# Patient Record
Sex: MALE | Race: BLACK OR AFRICAN AMERICAN | NOT HISPANIC OR LATINO | Employment: UNEMPLOYED | URBAN - NONMETROPOLITAN AREA
[De-identification: names, ages, dates, MRNs, and addresses within clinical notes are randomized per-mention and may not be internally consistent; named-entity substitution may affect disease eponyms.]

---

## 2017-10-18 ENCOUNTER — HOSPITAL ENCOUNTER (INPATIENT)
Facility: HOSPITAL | Age: 52
LOS: 7 days | Discharge: HOME | End: 2017-10-25
Attending: PSYCHIATRY & NEUROLOGY | Admitting: PSYCHIATRY & NEUROLOGY

## 2017-10-18 ENCOUNTER — HOSPITAL ENCOUNTER (EMERGENCY)
Facility: HOSPITAL | Age: 52
Discharge: PSYCHIATRIC OR SUBSTANCE USE FACILITY | End: 2017-10-18
Attending: EMERGENCY MEDICINE | Admitting: EMERGENCY MEDICINE

## 2017-10-18 VITALS
OXYGEN SATURATION: 98 % | HEIGHT: 71 IN | HEART RATE: 90 BPM | SYSTOLIC BLOOD PRESSURE: 131 MMHG | RESPIRATION RATE: 18 BRPM | BODY MASS INDEX: 25.9 KG/M2 | DIASTOLIC BLOOD PRESSURE: 86 MMHG | WEIGHT: 185 LBS | TEMPERATURE: 98.7 F

## 2017-10-18 DIAGNOSIS — R45.851 SUICIDAL IDEATIONS: Primary | ICD-10-CM

## 2017-10-18 PROBLEM — F31.81 BIPOLAR II DISORDER (HCC): Status: ACTIVE | Noted: 2017-10-18

## 2017-10-18 PROBLEM — F32.9 MAJOR DEPRESSION: Status: ACTIVE | Noted: 2017-10-18

## 2017-10-18 PROBLEM — F10.20 UNCOMPLICATED ALCOHOL DEPENDENCE (HCC): Status: ACTIVE | Noted: 2017-10-18

## 2017-10-18 LAB
ALBUMIN SERPL-MCNC: 4.5 G/DL (ref 3.5–5)
ALBUMIN/GLOB SERPL: 1.6 G/DL (ref 1–2)
ALP SERPL-CCNC: 69 U/L (ref 38–126)
ALT SERPL W P-5'-P-CCNC: 31 U/L (ref 13–69)
AMPHET+METHAMPHET UR QL: NEGATIVE
AMPHETAMINES UR QL: NEGATIVE
ANION GAP SERPL CALCULATED.3IONS-SCNC: 15.6 MMOL/L
APAP SERPL-MCNC: <10 MCG/ML
AST SERPL-CCNC: 23 U/L (ref 15–46)
BARBITURATES UR QL SCN: NEGATIVE
BASOPHILS # BLD AUTO: 0.07 10*3/MM3 (ref 0–0.2)
BASOPHILS NFR BLD AUTO: 0.7 % (ref 0–2.5)
BENZODIAZ UR QL SCN: NEGATIVE
BILIRUB SERPL-MCNC: 0.2 MG/DL (ref 0.2–1.3)
BUN BLD-MCNC: 15 MG/DL (ref 7–20)
BUN/CREAT SERPL: 13.6 (ref 6.3–21.9)
BUPRENORPHINE SERPL-MCNC: NEGATIVE NG/ML
CALCIUM SPEC-SCNC: 9.5 MG/DL (ref 8.4–10.2)
CANNABINOIDS SERPL QL: NEGATIVE
CHLORIDE SERPL-SCNC: 102 MMOL/L (ref 98–107)
CO2 SERPL-SCNC: 28 MMOL/L (ref 26–30)
COCAINE UR QL: NEGATIVE
CREAT BLD-MCNC: 1.1 MG/DL (ref 0.6–1.3)
DEPRECATED RDW RBC AUTO: 42.1 FL (ref 37–54)
EOSINOPHIL # BLD AUTO: 0.33 10*3/MM3 (ref 0–0.7)
EOSINOPHIL NFR BLD AUTO: 3.2 % (ref 0–7)
ERYTHROCYTE [DISTWIDTH] IN BLOOD BY AUTOMATED COUNT: 14.1 % (ref 11.5–14.5)
ETHANOL BLD-MCNC: <10 MG/DL
ETHANOL UR QL: <0.01 %
GFR SERPL CREATININE-BSD FRML MDRD: 71 ML/MIN/1.73
GFR SERPL CREATININE-BSD FRML MDRD: 86 ML/MIN/1.73
GLOBULIN UR ELPH-MCNC: 2.8 GM/DL
GLUCOSE BLD-MCNC: 86 MG/DL (ref 74–98)
HCT VFR BLD AUTO: 41.4 % (ref 42–52)
HGB BLD-MCNC: 13.4 G/DL (ref 14–18)
HOLD SPECIMEN: NORMAL
HOLD SPECIMEN: NORMAL
IMM GRANULOCYTES # BLD: 0.04 10*3/MM3 (ref 0–0.06)
IMM GRANULOCYTES NFR BLD: 0.4 % (ref 0–0.6)
LYMPHOCYTES # BLD AUTO: 3.46 10*3/MM3 (ref 0.6–3.4)
LYMPHOCYTES NFR BLD AUTO: 33.5 % (ref 10–50)
MAGNESIUM SERPL-MCNC: 1.9 MG/DL (ref 1.6–2.3)
MCH RBC QN AUTO: 26.9 PG (ref 27–31)
MCHC RBC AUTO-ENTMCNC: 32.4 G/DL (ref 30–37)
MCV RBC AUTO: 83 FL (ref 80–94)
METHADONE UR QL SCN: NEGATIVE
MONOCYTES # BLD AUTO: 0.78 10*3/MM3 (ref 0–0.9)
MONOCYTES NFR BLD AUTO: 7.6 % (ref 0–12)
NEUTROPHILS # BLD AUTO: 5.64 10*3/MM3 (ref 2–6.9)
NEUTROPHILS NFR BLD AUTO: 54.6 % (ref 37–80)
NRBC BLD MANUAL-RTO: 0 /100 WBC (ref 0–0)
OPIATES UR QL: NEGATIVE
OXYCODONE UR QL SCN: NEGATIVE
PCP UR QL SCN: NEGATIVE
PLATELET # BLD AUTO: 231 10*3/MM3 (ref 130–400)
PMV BLD AUTO: 11.1 FL (ref 6–12)
POTASSIUM BLD-SCNC: 3.6 MMOL/L (ref 3.5–5.1)
PROPOXYPH UR QL: NEGATIVE
PROT SERPL-MCNC: 7.3 G/DL (ref 6.3–8.2)
RBC # BLD AUTO: 4.99 10*6/MM3 (ref 4.7–6.1)
SALICYLATES SERPL-MCNC: <1 MG/DL (ref 2.8–20)
SODIUM BLD-SCNC: 142 MMOL/L (ref 137–145)
TRICYCLICS UR QL SCN: POSITIVE
WBC NRBC COR # BLD: 10.32 10*3/MM3 (ref 4.8–10.8)
WHOLE BLOOD HOLD SPECIMEN: NORMAL
WHOLE BLOOD HOLD SPECIMEN: NORMAL

## 2017-10-18 PROCEDURE — 80307 DRUG TEST PRSMV CHEM ANLYZR: CPT | Performed by: EMERGENCY MEDICINE

## 2017-10-18 PROCEDURE — HZ2ZZZZ DETOXIFICATION SERVICES FOR SUBSTANCE ABUSE TREATMENT: ICD-10-PCS | Performed by: PSYCHIATRY & NEUROLOGY

## 2017-10-18 PROCEDURE — 93005 ELECTROCARDIOGRAM TRACING: CPT | Performed by: EMERGENCY MEDICINE

## 2017-10-18 PROCEDURE — 80306 DRUG TEST PRSMV INSTRMNT: CPT | Performed by: EMERGENCY MEDICINE

## 2017-10-18 PROCEDURE — 85025 COMPLETE CBC W/AUTO DIFF WBC: CPT | Performed by: EMERGENCY MEDICINE

## 2017-10-18 PROCEDURE — 83735 ASSAY OF MAGNESIUM: CPT | Performed by: EMERGENCY MEDICINE

## 2017-10-18 PROCEDURE — 80053 COMPREHEN METABOLIC PANEL: CPT | Performed by: EMERGENCY MEDICINE

## 2017-10-18 PROCEDURE — 99223 1ST HOSP IP/OBS HIGH 75: CPT | Performed by: PSYCHIATRY & NEUROLOGY

## 2017-10-18 PROCEDURE — 99284 EMERGENCY DEPT VISIT MOD MDM: CPT

## 2017-10-18 RX ORDER — QUETIAPINE FUMARATE 300 MG/1
300 TABLET, FILM COATED ORAL NIGHTLY
Status: ON HOLD | COMMUNITY
End: 2017-10-18

## 2017-10-18 RX ORDER — ESCITALOPRAM OXALATE 20 MG/1
20 TABLET ORAL DAILY
Status: ON HOLD | COMMUNITY
End: 2017-10-18

## 2017-10-18 RX ORDER — NICOTINE 21 MG/24HR
1 PATCH, TRANSDERMAL 24 HOURS TRANSDERMAL EVERY 24 HOURS
Status: DISCONTINUED | OUTPATIENT
Start: 2017-10-18 | End: 2017-10-25 | Stop reason: HOSPADM

## 2017-10-18 RX ORDER — DIVALPROEX SODIUM 500 MG/1
1000 TABLET, EXTENDED RELEASE ORAL DAILY
Status: DISCONTINUED | OUTPATIENT
Start: 2017-10-18 | End: 2017-10-25 | Stop reason: HOSPADM

## 2017-10-18 RX ORDER — DIVALPROEX SODIUM 500 MG/1
1500 TABLET, EXTENDED RELEASE ORAL NIGHTLY
COMMUNITY
End: 2017-10-25 | Stop reason: HOSPADM

## 2017-10-18 RX ORDER — BENZTROPINE MESYLATE 1 MG/ML
0.5 INJECTION INTRAMUSCULAR; INTRAVENOUS DAILY PRN
Status: DISCONTINUED | OUTPATIENT
Start: 2017-10-18 | End: 2017-10-25 | Stop reason: HOSPADM

## 2017-10-18 RX ORDER — ACAMPROSATE CALCIUM 333 MG/1
333 TABLET, DELAYED RELEASE ORAL 2 TIMES DAILY
Status: ON HOLD | COMMUNITY
End: 2017-10-18

## 2017-10-18 RX ORDER — BENZTROPINE MESYLATE 1 MG/1
1 TABLET ORAL DAILY PRN
Status: DISCONTINUED | OUTPATIENT
Start: 2017-10-18 | End: 2017-10-25 | Stop reason: HOSPADM

## 2017-10-18 RX ORDER — ARIPIPRAZOLE 10 MG/1
10 TABLET ORAL DAILY
Status: DISCONTINUED | OUTPATIENT
Start: 2017-10-18 | End: 2017-10-23

## 2017-10-18 RX ORDER — ALUMINA, MAGNESIA, AND SIMETHICONE 2400; 2400; 240 MG/30ML; MG/30ML; MG/30ML
15 SUSPENSION ORAL EVERY 6 HOURS PRN
Status: DISCONTINUED | OUTPATIENT
Start: 2017-10-18 | End: 2017-10-25 | Stop reason: HOSPADM

## 2017-10-18 RX ORDER — LORAZEPAM 0.5 MG/1
0.5 TABLET ORAL 3 TIMES DAILY
Status: ON HOLD | COMMUNITY
End: 2017-10-18

## 2017-10-18 RX ORDER — QUETIAPINE FUMARATE 200 MG/1
400 TABLET, FILM COATED ORAL NIGHTLY
COMMUNITY
End: 2017-10-25 | Stop reason: HOSPADM

## 2017-10-18 RX ORDER — ACAMPROSATE CALCIUM 333 MG/1
666 TABLET, DELAYED RELEASE ORAL 3 TIMES DAILY
Status: DISCONTINUED | OUTPATIENT
Start: 2017-10-18 | End: 2017-10-25 | Stop reason: HOSPADM

## 2017-10-18 RX ORDER — PANTOPRAZOLE SODIUM 40 MG/1
40 TABLET, DELAYED RELEASE ORAL EVERY MORNING
Status: CANCELLED | OUTPATIENT
Start: 2017-10-18

## 2017-10-18 RX ORDER — HYDROXYZINE 50 MG/1
50 TABLET, FILM COATED ORAL EVERY 6 HOURS PRN
Status: DISCONTINUED | OUTPATIENT
Start: 2017-10-18 | End: 2017-10-25 | Stop reason: HOSPADM

## 2017-10-18 RX ORDER — METOPROLOL SUCCINATE 50 MG/1
100 TABLET, EXTENDED RELEASE ORAL DAILY
Status: CANCELLED | OUTPATIENT
Start: 2017-10-18

## 2017-10-18 RX ORDER — ACAMPROSATE CALCIUM 333 MG/1
333 TABLET, DELAYED RELEASE ORAL 2 TIMES DAILY
Status: CANCELLED | OUTPATIENT
Start: 2017-10-18

## 2017-10-18 RX ORDER — DIVALPROEX SODIUM 500 MG/1
1000 TABLET, EXTENDED RELEASE ORAL NIGHTLY
Status: CANCELLED | OUTPATIENT
Start: 2017-10-18

## 2017-10-18 RX ORDER — METOPROLOL SUCCINATE 100 MG/1
100 TABLET, EXTENDED RELEASE ORAL DAILY
Status: ON HOLD | COMMUNITY
End: 2017-10-18

## 2017-10-18 RX ORDER — DIVALPROEX SODIUM 500 MG/1
1000 TABLET, EXTENDED RELEASE ORAL NIGHTLY
Status: ON HOLD | COMMUNITY
End: 2017-10-18

## 2017-10-18 RX ORDER — ECHINACEA PURPUREA EXTRACT 125 MG
2 TABLET ORAL AS NEEDED
Status: DISCONTINUED | OUTPATIENT
Start: 2017-10-18 | End: 2017-10-25 | Stop reason: HOSPADM

## 2017-10-18 RX ORDER — LORAZEPAM 0.5 MG/1
0.5 TABLET ORAL 3 TIMES DAILY
Status: CANCELLED | OUTPATIENT
Start: 2017-10-18

## 2017-10-18 RX ORDER — OMEPRAZOLE 20 MG/1
20 CAPSULE, DELAYED RELEASE ORAL DAILY
Status: ON HOLD | COMMUNITY
End: 2017-10-18

## 2017-10-18 RX ORDER — QUETIAPINE FUMARATE 300 MG/1
300 TABLET, FILM COATED ORAL NIGHTLY
Status: CANCELLED | OUTPATIENT
Start: 2017-10-18

## 2017-10-18 RX ORDER — BENZONATATE 100 MG/1
100 CAPSULE ORAL 3 TIMES DAILY PRN
Status: DISCONTINUED | OUTPATIENT
Start: 2017-10-18 | End: 2017-10-25 | Stop reason: HOSPADM

## 2017-10-18 RX ORDER — TRAZODONE HYDROCHLORIDE 50 MG/1
100 TABLET ORAL NIGHTLY PRN
Status: DISCONTINUED | OUTPATIENT
Start: 2017-10-18 | End: 2017-10-22

## 2017-10-18 RX ORDER — IBUPROFEN 600 MG/1
600 TABLET ORAL EVERY 6 HOURS PRN
Status: DISCONTINUED | OUTPATIENT
Start: 2017-10-18 | End: 2017-10-25 | Stop reason: HOSPADM

## 2017-10-18 RX ADMIN — DIVALPROEX SODIUM 1000 MG: 500 TABLET, FILM COATED, EXTENDED RELEASE ORAL at 18:10

## 2017-10-18 RX ADMIN — ACAMPROSATE CALCIUM 666 MG: 333 TABLET, DELAYED RELEASE ORAL at 21:12

## 2017-10-18 RX ADMIN — ARIPIPRAZOLE 10 MG: 10 TABLET ORAL at 18:09

## 2017-10-18 RX ADMIN — NICOTINE 1 PATCH: 21 PATCH TRANSDERMAL at 11:40

## 2017-10-18 RX ADMIN — ACAMPROSATE CALCIUM 666 MG: 333 TABLET, DELAYED RELEASE ORAL at 18:09

## 2017-10-18 NOTE — ED NOTES
Contacted Alexia, behavioral health regarding pt. Alexia to be in to evaluate pt     Mahogany Cao RN  10/18/17 1278

## 2017-10-18 NOTE — ED NOTES
"[id number]    DATA: Mental health navigator received a behavioral health assessment request from JANINE Vides. Navigator met with patient at bedside. Patient is a 51 year old  male who presents to the emergency room complaining of visual hallucinations and suicidal ideation. The patient reports suicidal ideation with the plan to shoot self in the head. Patient reports that he has access to a gun to carry out plan. The patient reports being diagnosed with bipolar and schizophrenia. The patient reports that he has been unable to make his appointments in [place] to see his primary care physician which had ultimately caused him not to be able to refill his medications. The patient reports that on top of not taking his medications, his girlfriend recently broke up with him. Patient reports that he has a history of substance abuse and stated, \"I only use when I don't have my meds..inflammatory bowel disease self medicate.\" The patient reports using marijuana, cocaine, and alcohol approximately 4 days ago. The patient continues to report suicidal ideation but denies homicidal ideation at this time.       ASSESSMENT: Upon assessment, patient is alert and oriented x3. Patient was cooperative and appeared eager to receive treatment. The patient appears to have maladaptive coping skills as evidenced by a history of substance abuse during high stress situations in which he is not on his medications. The patient reports suicidal ideation with a plan to shoot self. The patient denies homicidal ideation or any history of violent behaviors. The patient reports a past suicide attempt 4 to 5 years ago and reports being at the [facility] for 90 days. The patient reports visual hallucinations in which he reports seeing shadows all around him. Navigator completed a [place] Suicide Severity Rating Scale in which the patient identified as a high risk for suicide given his plan, intent and the means to carryout plan. The " "patient appears to lack a positive support system as he stated, \"no one really understands what I am going through but my sister and I don't get to talk to her often.\" Navigator does not believe the patient is safe to return home at this time.     PLAN: Mental health navigator recommends inpatient psychiatric treatment for stabilization. Patient is agreeable to direct admission to the [facility]. Patient was accepted by on-call psychiatrist,xochitl Lord. The patient will be transported via [facility]. MR. Franco from [facility] reports that they will be arriving around 0700. Dr. Alegria and Concha, RN, are both aware of the plan at this time. Concha RN, has given report to [facility].      JAMES Lieberman  10/18/17 0607    "

## 2017-10-18 NOTE — PLAN OF CARE
Problem: BH Patient Care Overview (Adult)  Goal: Plan of Care Review  Outcome: Ongoing (interventions implemented as appropriate)    10/18/17 1541   Coping/Psychosocial Response Interventions   Plan Of Care Reviewed With patient   Coping/Psychosocial   Patient Agreement with Plan of Care agrees   Patient Care Overview   Consent Given to Review Plan with Declined    Progress progress toward functional goals is gradual   Outcome Evaluation   Outcome Summary/Follow up Plan Therapist met with new admit for initial evaluation; patient agreeable              Goal: Interdisciplinary Rounds/Family Conference  Outcome: Ongoing (interventions implemented as appropriate)    10/18/17 1541   Interdisciplinary Rounds/Family Conf   Summary Treatment team evaluation and staffing    Participants psychiatrist;social work;patient;nursing             Goal: Individualization and Mutuality  Outcome: Ongoing (interventions implemented as appropriate)    10/18/17 1522 10/18/17 1541   Behavioral Health Screens   Patient Personal Strengths expressive of emotions;expressive of needs;realistic evaluation of current/future capabilities;motivated for recovery;motivated for treatment;spiritual/Pent[employer]alism support;interests/hobbies --    Patient Personal Strengths Comment --  Willing to seek help    Patient Vulnerabilities Ineffective coping, homelessness, lack of support  --    Individualization   Patient Specific Preferences --  None    Patient Specific Goals --  Medication adjustment; rehab referral    Patient Specific Interventions --  Therapist will offer 1-4 individual, family education, aftercare planning involving residential referral    Mutuality/Individual Preferences   What Anxieties, Fears or Concerns Do You Have About Your Health or Care? --  None    What Questions Do You Have About Your Health or Care? --  None    What Information Would Help Us Give You More Personalized Care? --  Patient's nickname is 'popcorn'. Hobbies including food  and has been a  in the past.              Goal: Discharge Needs Assessment  Outcome: Ongoing (interventions implemented as appropriate)    10/18/17 1541   Discharge Needs Assessment   Concerns To Be Addressed coping/stress concerns;substance/tobacco abuse/use concerns;suicidal concerns   Readmission Within The Last 30 Days no previous admission in last 30 days   Community Agency Name(S) Patient considering residential options; would like to check on [facility] in [facility]    Current Discharge Risk substance abuse;psychiatric illness;homeless   Discharge Planning Comments Patient considering entering rehab. He has Medicare/Medicaid coverage and does not anticipate barriers to discharge medication.    Discharge Needs Assessment   Outpatient/Agency/Support Group Needs residential services   Anticipated Discharge Disposition homeless   Discharge Disposition homeless   Living Environment   Transportation Available public transportation         1540:        DATA:         Therapist met individually with patient this date to introduce role and to discuss hospitalization expectations. Patient agreeable.      Therapist completed integrated summary, treatment plan, and initiated social history this date.  Therapist is strongly recommending a family session prior to discharge.          ASSESSMENT:         Mr. Kt Hernandez is a 51 year old  with no history of admission.  Patient required admission due to suicidal thoughts to shoot self.  Patient reports that he has been diagnosed with Bipolar depression in the past.  He has been sporadic with medications due to moving to [place] recently. Patient reports worsening depression over the last several months intensifying within in the last few days and thoughts of harming himself. He reports having a fight with his significant other over his alcohol use and feeling like he would go to the shed to get a gun and shoot himself.  He  reports history of Bipolar Delilah and hearing voices telling him to kill himself.  Patient reports that he feels that he has burned bridges with his girlfriend and his mother due to mental illness and alcoholism.  Patient also reports chronic alcohol use and decribes drinking up to a gallon of liquor, case of beer, and wine on the weekends with friends.  He reports last use 3-4 days ago.  Patient has history of residential treatment 5 years ago at [facility] rehab in [facility]. Patient requests to return there if they have an open bed.  Today, patient is calm and cooperative. He appears motivated for recovery.       PLAN:       Treatment team will focus efforts on stabilizing patient's acute symptoms while providing education on healthy coping and crisis management to reduce hospitalizations.   Patient requires daily psychiatrist evaluation and 24/7 nursing supervision to promote patient  safety.     Therapist will offer 1-4 individual sessions (20-30 minutes each), 1 therapy group daily, family education, and appropriate referral.     Therapist recommends residential referral.  Patient has signed consent for [facility] in [facility].

## 2017-10-18 NOTE — ED PROVIDER NOTES
Subjective   HPI Comments: 51-year-old male presenting with suicidal ideations.  He states that over the last several months due to moving he has been off and on his psychiatric medications.  The last few days he's had increasing depression and thoughts of hurting harm himself.  He has had a fight with his significant other and felt like going to the shed to get a gun to shoot himself.  He has spent time in psychiatric facility several times.  He does smoke and drink alcohol, recently used cocaine as well.      Review of Systems   Constitutional: Negative for chills and fever.   HENT: Negative for congestion, rhinorrhea and sore throat.    Eyes: Negative for pain.   Respiratory: Negative for cough and shortness of breath.    Cardiovascular: Negative for chest pain, palpitations and leg swelling.   Gastrointestinal: Negative for abdominal pain, diarrhea, nausea and vomiting.   Genitourinary: Negative for dysuria.   Musculoskeletal: Negative for arthralgias.   Skin: Negative for rash.   Neurological: Negative for weakness and numbness.   Psychiatric/Behavioral: Positive for dysphoric mood and suicidal ideas. Negative for behavioral problems and self-injury.       Past Medical History:   Diagnosis Date   • Bipolar depression    • Depression    • Schizophrenia        No Known Allergies    History reviewed. No pertinent surgical history.    History reviewed. No pertinent family history.    Social History     Social History   • Marital status: Single     Spouse name: N/A   • Number of children: N/A   • Years of education: N/A     Social History Main Topics   • Smoking status: Current Every Day Smoker     Packs/day: 0.50     Types: Cigarettes   • Smokeless tobacco: Never Used   • Alcohol use No   • Drug use: No   • Sexual activity: Not Asked     Other Topics Concern   • None     Social History Narrative   • None           Objective   Physical Exam   Constitutional: He is oriented to person, place, and time. He appears  well-developed and well-nourished. No distress.   HENT:   Head: Normocephalic and atraumatic.   Right Ear: External ear normal.   Left Ear: External ear normal.   Nose: Nose normal.   Mouth/Throat: Oropharynx is clear and moist.   Eyes: Conjunctivae and EOM are normal. Pupils are equal, round, and reactive to light.   Neck: Normal range of motion. Neck supple.   Cardiovascular: Normal rate, regular rhythm, normal heart sounds and intact distal pulses.    Pulmonary/Chest: Effort normal and breath sounds normal. No respiratory distress.   Abdominal: Soft. Bowel sounds are normal. He exhibits no distension. There is no tenderness. There is no rebound and no guarding.   Musculoskeletal: Normal range of motion. He exhibits no edema, tenderness or deformity.   Neurological: He is alert and oriented to person, place, and time.   Skin: Skin is warm and dry. No rash noted.   Psychiatric: He has a normal mood and affect. His behavior is normal.   Nursing note and vitals reviewed.      Procedures         ED Course  ED Course                  MDM  Number of Diagnoses or Management Options  Suicidal ideations:   Diagnosis management comments: 51-year-old male with depression and suicidal ideations.  Well-developed, well-nourished man in no distress with normal vital signs and exam as above.  We'll check labs and consult behavioral health.  Disposition pending workup and consultation.    DDX: Depression, anxiety, schizophrenia, medication noncompliance, suicidal ideations    EKG: Sinus rhythm, normal rate, normal axis/intervals, no acute ST changes    Accepted at [facility] in [place].      Final diagnoses:   Suicidal ideations            Timothy Alegria MD  10/18/17 0528
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
## 2017-10-18 NOTE — NURSING NOTE
Pt on to floor.   Pt states he was on 7 meds for his several Diag. And when he moved here to [place] they changed his meds to only 4 meds he started having issues. Pt states he has only taken a few meds in the last year and wishes to get back on all 7 meds that work well for him and he held down a part time job. I spoke to pharm and they are now getting records from [facility] in [place], [place] which is right outside of [facility]. Pt is very clean, well mannered and calm at present just simply tired from being up all night. Rates depression 8 anxiety is 8.

## 2017-10-19 PROCEDURE — 99232 SBSQ HOSP IP/OBS MODERATE 35: CPT | Performed by: PSYCHIATRY & NEUROLOGY

## 2017-10-19 RX ADMIN — ALUMINUM HYDROXIDE, MAGNESIUM HYDROXIDE, AND DIMETHICONE 15 ML: 400; 400; 40 SUSPENSION ORAL at 00:36

## 2017-10-19 RX ADMIN — NICOTINE 1 PATCH: 21 PATCH TRANSDERMAL at 11:08

## 2017-10-19 RX ADMIN — TRAZODONE HYDROCHLORIDE 100 MG: 50 TABLET ORAL at 00:36

## 2017-10-19 RX ADMIN — ACAMPROSATE CALCIUM 666 MG: 333 TABLET, DELAYED RELEASE ORAL at 20:33

## 2017-10-19 RX ADMIN — TRAZODONE HYDROCHLORIDE 100 MG: 50 TABLET ORAL at 22:01

## 2017-10-19 RX ADMIN — ARIPIPRAZOLE 10 MG: 10 TABLET ORAL at 09:03

## 2017-10-19 RX ADMIN — HYDROXYZINE HYDROCHLORIDE 50 MG: 50 TABLET ORAL at 22:01

## 2017-10-19 RX ADMIN — ACAMPROSATE CALCIUM 666 MG: 333 TABLET, DELAYED RELEASE ORAL at 09:03

## 2017-10-19 RX ADMIN — DIVALPROEX SODIUM 1000 MG: 500 TABLET, FILM COATED, EXTENDED RELEASE ORAL at 09:03

## 2017-10-19 RX ADMIN — ACAMPROSATE CALCIUM 666 MG: 333 TABLET, DELAYED RELEASE ORAL at 15:46
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
## 2017-10-19 NOTE — PLAN OF CARE
Problem: BH Patient Care Overview (Adult)  Goal: Plan of Care Review  Outcome: Ongoing (interventions implemented as appropriate)    10/19/17 1841   Coping/Psychosocial Response Interventions   Plan Of Care Reviewed With patient   Coping/Psychosocial   Patient Agreement with Plan of Care agrees   Patient Care Overview   Progress improving   Outcome Evaluation   Outcome Summary/Follow up Plan Pt in very pleasant mood today. interacting well with peers and staff. Anxious to go to [place].

## 2017-10-19 NOTE — PROGRESS NOTES
"1120:       DATA:      Therapist met individually with patient this date. Reintroduced self to patient from initial assessment began yesterday.  Discussed progress in treatment and any needs/concerns. Therapist reviewed plans to [facility] [place].  Reviewed plans to ask if the rehab provide [employer] bus ticket, as we can not provide this service. Encouraged patient to have a back up plan in the event that he can not get to [place].  Patient agreeable to other referrals this date.       ASSESSMENT:     Patient observed to have restricted affect and congruent mood.  Patient identifies his mood as \"frustrated, down.\"  He remains insightful in treatment. Patient appears open and honest and processes his emotions during session.  Patient appears frustrated regarding recent alcohol use and conflict with family/significant other.  Patient appears motivated to enter rehab treatment.  Patient calm and polite with staff.      Plan:    Patient to remain hospitalized this date.  Aftercare pending.  Patient has signed consents for [facility] [place], [facility] in [facility], [facility] in [facility], and [facility] in [facility].    "

## 2017-10-19 NOTE — PROGRESS NOTES
"INPATIENT PSYCHIATRIC PROGRESS NOTE    Name:  Kt Hernandez  :  1965  MRN:  [id number]  Visit Number:  [id number]  Length of stay:  1    SUBJECTIVE  CC: depression    INTERVAL HISTORY: The patient states that he is feeling a little agitated and grumpy. States that it is his birthday today and he would rather be here than out there getting drunk.    Depression rating 8/10  Anxiety rating 810  Sleep: disrupted  Withdrawal sx: None  Craving: 3/10    Review of Systems   Constitutional: Negative.    Respiratory: Negative.    Cardiovascular: Negative.    Gastrointestinal: Negative.        OBJECTIVE    Temp:  [98 °F (36.7 °C)] 98 °F (36.7 °C)  Heart Rate:  [89-90] 89  Resp:  [18] 18  BP: (122-124)/(70-75) 122/70    MENTAL STATUS EXAM:  Appearance:Casually dressed, good hygeine.   Cooperation:Cooperative  Psychomotor: No psychomotor agitation/retardation, No EPS, No motor tics  Speech-normal rate, amount.  Mood \"depresssed\"   Affect-congruent, appropriate, stable  Thought Content-goal directed, no delusional material present  Thought process-linear, organized.  Suicidality: No SI  Homicidality: No HI  Perception: No AH/VH  Insight-fair   Judgement-fair    Lab Results (last 24 hours)     ** No results found for the last 24 hours. **             Imaging Results (last 24 hours)     ** No results found for the last 24 hours. **             ECG/EMG Results (most recent)     None           ALLERGIES: Review of patient's allergies indicates no known allergies.      Current Facility-Administered Medications:   •  acamprosate (CAMPRAL) EC tablet 666 mg, 666 mg, Oral, TID, Bel Boogie MD, 666 mg at 10/19/17 09  •  aluminum-magnesium hydroxide-simethicone (MAALOX MAX) 400-400-40 MG/5ML suspension 15 mL, 15 mL, Oral, Q6H PRN, Davion Lord MD, 15 mL at 10/19/17 0036  •  ARIPiprazole (ABILIFY) tablet 10 mg, 10 mg, Oral, Daily, Bel Boogie MD, 10 mg at 10/19/17 09  •  benzonatate (TESSALON) capsule 100 mg, " 100 mg, Oral, TID PRN, Davion Lord MD  •  benztropine (COGENTIN) tablet 1 mg, 1 mg, Oral, Daily PRN **OR** benztropine (COGENTIN) injection 0.5 mg, 0.5 mg, Intramuscular, Daily PRN, Davion Lord MD  •  divalproex (DEPAKOTE) 24 hr tablet 1,000 mg, 1,000 mg, Oral, Daily, Bel Boogie MD, 1,000 mg at 10/19/17 0903  •  hydrOXYzine (ATARAX) tablet 50 mg, 50 mg, Oral, Q6H PRN, Davion Lord MD  •  ibuprofen (ADVIL,MOTRIN) tablet 600 mg, 600 mg, Oral, Q6H PRN, Davion Lord MD  •  magnesium hydroxide (MILK OF MAGNESIA) suspension 2400 mg/10mL 10 mL, 10 mL, Oral, Daily PRN, Davion Lord MD  •  nicotine (NICODERM CQ) 21 MG/24HR patch 1 patch, 1 patch, Transdermal, Q24H, Davion Lord MD, 1 patch at 10/19/17 1108  •  sodium chloride (OCEAN) nasal spray 2 spray, 2 spray, Each Nare, PRN, Davion Lord MD  •  traZODone (DESYREL) tablet 100 mg, 100 mg, Oral, Nightly PRN, Davion Lord MD, 100 mg at 10/19/17 0036    ASSESSMENT & PLAN:    Active Problems:    Bipolar II disorder  Plan: Continue Abilify and Depakote      Uncomplicated alcohol dependence  Plan: Campral      Suicide precautions: Suicide precaution Level 3 (q15 min checks)     Behavioral Health Treatment Plan and Problem List: I have reviewed and approved the Behavioral Health Treatment Plan and Problem list.  The patient has had a chance to review and agrees with the treatment plan.     Clinician:  Bel Boogie MD  10/19/17  1:41 PM

## 2017-10-19 NOTE — PLAN OF CARE
Problem: BH Patient Care Overview (Adult)  Goal: Plan of Care Review  Outcome: Ongoing (interventions implemented as appropriate)    10/19/17 0605   Coping/Psychosocial Response Interventions   Plan Of Care Reviewed With patient   Coping/Psychosocial   Patient Agreement with Plan of Care agrees   Patient Care Overview   Progress improving   Outcome Evaluation   Outcome Summary/Follow up Plan Pt. stable and slept all night. No complaints.

## 2017-10-19 NOTE — PROGRESS NOTES
Navigator is helping primary therapist with discharge planning. Navigator faxed the following referrals on this day.       [facility]  [phone]    [facility]   [phone]    [facility]   [phone]  Denied patient due to insurance coverage. Patients insurance is advantage plan and that will not cover their stay at [facility]

## 2017-10-20 PROCEDURE — 99232 SBSQ HOSP IP/OBS MODERATE 35: CPT | Performed by: PSYCHIATRY & NEUROLOGY

## 2017-10-20 RX ADMIN — DIVALPROEX SODIUM 1000 MG: 500 TABLET, FILM COATED, EXTENDED RELEASE ORAL at 08:18

## 2017-10-20 RX ADMIN — ACAMPROSATE CALCIUM 666 MG: 333 TABLET, DELAYED RELEASE ORAL at 08:18

## 2017-10-20 RX ADMIN — ACAMPROSATE CALCIUM 666 MG: 333 TABLET, DELAYED RELEASE ORAL at 21:02

## 2017-10-20 RX ADMIN — NICOTINE 1 PATCH: 21 PATCH TRANSDERMAL at 10:30

## 2017-10-20 RX ADMIN — HYDROXYZINE HYDROCHLORIDE 50 MG: 50 TABLET ORAL at 22:24

## 2017-10-20 RX ADMIN — HYDROXYZINE HYDROCHLORIDE 50 MG: 50 TABLET ORAL at 17:21

## 2017-10-20 RX ADMIN — TRAZODONE HYDROCHLORIDE 100 MG: 50 TABLET ORAL at 22:24

## 2017-10-20 RX ADMIN — HYDROXYZINE HYDROCHLORIDE 50 MG: 50 TABLET ORAL at 08:18

## 2017-10-20 RX ADMIN — ACAMPROSATE CALCIUM 666 MG: 333 TABLET, DELAYED RELEASE ORAL at 17:19

## 2017-10-20 RX ADMIN — ARIPIPRAZOLE 10 MG: 10 TABLET ORAL at 08:18

## 2017-10-20 NOTE — PROGRESS NOTES
"INPATIENT PSYCHIATRIC PROGRESS NOTE    Name:  Kt Hernandez  :  1965  MRN:  [id number]  Visit Number:  [id number]  Length of stay:  2    SUBJECTIVE  CC: depression    INTERVAL HISTORY: The patient states that he was feeling bad earlier this morning but after a session with , he talked and prayed about some of the issues bothering him, and he is feeling some better. He states that he is feeling safe in the hospital, but is afraid if he left today, he will end up hurting himself as he is still dealing with a lot of issues. He is hoping to go to rehab.    Depression rating 10  Anxiety rating 5/10  Sleep: disrupted  Withdrawal sx: None  Craving: 3/10    Review of Systems   Constitutional: Negative.    Respiratory: Negative.    Cardiovascular: Negative.    Gastrointestinal: Negative.        OBJECTIVE    Temp:  [97.9 °F (36.6 °C)-98.8 °F (37.1 °C)] 98.8 °F (37.1 °C)  Heart Rate:  [89-96] 89  Resp:  [18] 18  BP: (118-127)/(74-76) 118/76    MENTAL STATUS EXAM:  Appearance:Casually dressed, good hygeine.   Cooperation:Cooperative  Psychomotor: No psychomotor agitation/retardation, No EPS, No motor tics  Speech-normal rate, amount.  Mood \"depresssed\"   Affect-congruent, appropriate, stable  Thought Content-goal directed, no delusional material present  Thought process-linear, organized.  Suicidality: No SI in the hospital, but doesn't feel safe leaving the hospital.  Homicidality: No HI  Perception: No AH/VH  Insight-fair   Judgement-fair    Lab Results (last 24 hours)     ** No results found for the last 24 hours. **             Imaging Results (last 24 hours)     ** No results found for the last 24 hours. **             ECG/EMG Results (most recent)     None           ALLERGIES: Review of patient's allergies indicates no known allergies.      Current Facility-Administered Medications:   •  acamprosate (CAMPRAL) EC tablet 666 mg, 666 mg, Oral, TID, Bel Boogie MD, 666 mg at 10/20/17 0818  •  " aluminum-magnesium hydroxide-simethicone (MAALOX MAX) 400-400-40 MG/5ML suspension 15 mL, 15 mL, Oral, Q6H PRN, Davion Lord MD, 15 mL at 10/19/17 0036  •  ARIPiprazole (ABILIFY) tablet 10 mg, 10 mg, Oral, Daily, Bel Boogie MD, 10 mg at 10/20/17 0818  •  benzonatate (TESSALON) capsule 100 mg, 100 mg, Oral, TID PRN, Davion Lord MD  •  benztropine (COGENTIN) tablet 1 mg, 1 mg, Oral, Daily PRN **OR** benztropine (COGENTIN) injection 0.5 mg, 0.5 mg, Intramuscular, Daily PRN, Davion Lord MD  •  divalproex (DEPAKOTE) 24 hr tablet 1,000 mg, 1,000 mg, Oral, Daily, Bel Boogie MD, 1,000 mg at 10/20/17 0818  •  hydrOXYzine (ATARAX) tablet 50 mg, 50 mg, Oral, Q6H PRN, Davion Lord MD, 50 mg at 10/20/17 0818  •  ibuprofen (ADVIL,MOTRIN) tablet 600 mg, 600 mg, Oral, Q6H PRN, Davion Lord MD  •  magnesium hydroxide (MILK OF MAGNESIA) suspension 2400 mg/10mL 10 mL, 10 mL, Oral, Daily PRN, Davion Lord MD  •  nicotine (NICODERM CQ) 21 MG/24HR patch 1 patch, 1 patch, Transdermal, Q24H, Davion Lord MD, 1 patch at 10/20/17 1030  •  sodium chloride (OCEAN) nasal spray 2 spray, 2 spray, Each Nare, PRN, Davion Lord MD  •  traZODone (DESYREL) tablet 100 mg, 100 mg, Oral, Nightly PRN, Davion Lord MD, 100 mg at 10/19/17 2201    ASSESSMENT & PLAN:    Active Problems:    Bipolar II disorder  Plan: Continue Abilify and Depakote      Uncomplicated alcohol dependence  Plan: Campral      Suicide precautions: Suicide precaution Level 3 (q15 min checks)     Behavioral Health Treatment Plan and Problem List: I have reviewed and approved the Behavioral Health Treatment Plan and Problem list.  The patient has had a chance to review and agrees with the treatment plan.     Clinician:  Bel Boogie MD  10/20/17  11:23 AM

## 2017-10-20 NOTE — PROGRESS NOTES
1500    DATA:      Therapist met individually with patient this date. Discussed progress in treatment and any needs/concerns. Therapist continues to staff with Navigator Monica who reports unsuccessful rehab referrals due to patient's primary insurance being [employer] Medicare replacement.      Therapist met with patient to discuss this.  He states that he cancelled [employer] coverage yesterday.  Therapist contacted Radha with [employer] for further guidance.  She reports that [employer] is still showing as active and it may be a few days before cancelled.  She reports that she believes that Medicare AB will then be primary. Therapist plans to contact Radha again on Monday to find out if Medicare is primary so that we can continue rehab referrals.  Patient may have to enter rehab such as [facility] or [facility] due to these facilities taking Medicare.  Patient is aware of this situation and therapist will follow up with him on Monday.      ASSESSMENT:     Patient observed to have restricted affect and depressed mood.  He continues to be a positive participant in treatment and was able to process some feelings with  earlier in the day.  He identifies his spiritual strength as a protective factor.      Plan:    Patient to remain hospitalized this date.  Aftercare pending with residential referrals due insurance conflicts.  Can not rule out possible need for homeless shelter referral if we can not find an accepting residential facility.

## 2017-10-20 NOTE — PLAN OF CARE
Problem: BH Patient Care Overview (Adult)  Goal: Plan of Care Review  Outcome: Ongoing (interventions implemented as appropriate)    10/20/17 0401   Coping/Psychosocial Response Interventions   Plan Of Care Reviewed With patient   Coping/Psychosocial   Patient Agreement with Plan of Care agrees   Patient Care Overview   Progress improving   Outcome Evaluation   Outcome Summary/Follow up Plan Pt andra schwartz hsift. Rates anxiety 7/10 and depression 5/10. Denies SI/HI and DARIELA.

## 2017-10-20 NOTE — PLAN OF CARE
Problem: BH Patient Care Overview (Adult)  Goal: Plan of Care Review  Outcome: Ongoing (interventions implemented as appropriate)    10/20/17 9352   Coping/Psychosocial Response Interventions   Plan Of Care Reviewed With patient   Coping/Psychosocial   Patient Agreement with Plan of Care agrees   Patient Care Overview   Progress improving   Outcome Evaluation   Outcome Summary/Follow up Plan Pt very pleasant mood. Denies SI, hopeful for rehab.

## 2017-10-21 PROCEDURE — 99232 SBSQ HOSP IP/OBS MODERATE 35: CPT | Performed by: PSYCHIATRY & NEUROLOGY

## 2017-10-21 RX ADMIN — IBUPROFEN 600 MG: 600 TABLET ORAL at 14:58

## 2017-10-21 RX ADMIN — TRAZODONE HYDROCHLORIDE 100 MG: 50 TABLET ORAL at 22:00

## 2017-10-21 RX ADMIN — ACAMPROSATE CALCIUM 666 MG: 333 TABLET, DELAYED RELEASE ORAL at 16:45

## 2017-10-21 RX ADMIN — NICOTINE 1 PATCH: 21 PATCH TRANSDERMAL at 11:20

## 2017-10-21 RX ADMIN — ACAMPROSATE CALCIUM 666 MG: 333 TABLET, DELAYED RELEASE ORAL at 22:00

## 2017-10-21 RX ADMIN — ALUMINUM HYDROXIDE, MAGNESIUM HYDROXIDE, AND DIMETHICONE 15 ML: 400; 400; 40 SUSPENSION ORAL at 16:25

## 2017-10-21 RX ADMIN — HYDROXYZINE HYDROCHLORIDE 50 MG: 50 TABLET ORAL at 14:58

## 2017-10-21 RX ADMIN — ACAMPROSATE CALCIUM 666 MG: 333 TABLET, DELAYED RELEASE ORAL at 08:03

## 2017-10-21 RX ADMIN — DIVALPROEX SODIUM 1000 MG: 500 TABLET, FILM COATED, EXTENDED RELEASE ORAL at 08:03

## 2017-10-21 RX ADMIN — ARIPIPRAZOLE 10 MG: 10 TABLET ORAL at 08:03

## 2017-10-21 NOTE — PLAN OF CARE
Problem: BH Patient Care Overview (Adult)  Goal: Plan of Care Review  Outcome: Ongoing (interventions implemented as appropriate)    10/21/17 0259   Coping/Psychosocial Response Interventions   Plan Of Care Reviewed With patient   Coping/Psychosocial   Patient Agreement with Plan of Care agrees   Patient Care Overview   Progress no change   Outcome Evaluation   Outcome Summary/Follow up Plan Pt cooperative this shift. Spent time in dayroom socializing with peers. Rates anxiety 7/10 and depression 3/10. Denies SI/HI and DARIELA.

## 2017-10-21 NOTE — PLAN OF CARE
Problem: BH Patient Care Overview (Adult)  Goal: Plan of Care Review  Outcome: Ongoing (interventions implemented as appropriate)    10/21/17 1093   Coping/Psychosocial Response Interventions   Plan Of Care Reviewed With patient   Coping/Psychosocial   Patient Agreement with Plan of Care agrees   Patient Care Overview   Progress improving   Outcome Evaluation   Outcome Summary/Follow up Plan Pt with positive attitude. Interacts well with peers and staff throught the day.

## 2017-10-21 NOTE — PROGRESS NOTES
Inpatient Psy Progress Note   Clinician: Davion Lord MD  Admission Date: 10/18/2017  9:05 AM 10/21/17    Behavioral Health Treatment Plan and Problem List: I have reviewed and approved the Behavioral Health Treatment Plan and Problem list.    Allergies  No Known Allergies    Hospital Day: 3 days      Assessment completed within view of staff    History  CC: inpatient followup  Interval HPI: Patient seen and evaluated by me.  Chart reviewed.  Patient rates his current level depression at a 5 out of 10.  Anxiety 4 out of 10.  He rates craving at a 4 out of 10.  He is tolerating his medications okay.  He thinks that the medication is helping with craving some.  Review of systems is negative as below.  Denies suicidal ideation this morning.    Interval Review of Systems:   General ROS: negative for - fever or malaise  Endocrine ROS: negative for - palpitations  Respiratory ROS: no cough, shortness of breath, or wheezing  Cardiovascular ROS: no chest pain or dyspnea on exertion  Gastrointestinal ROS: no abdominal pain,no black or bloody stools    /75 (BP Location: Right arm, Patient Position: Lying)  Pulse 85  Temp 98.5 °F (36.9 °C) (Temporal Artery )   Resp 18  Wt 193 lb 6.4 oz (87.7 kg)  SpO2 97%  BMI 26.97 kg/m2    Mental Status Exam  Mood: depressed  Affect: mood-congruent  Thought Processes: linear, logical, and goal directed  Thought Content:  Normal  Hallucinations: no  Suicidal Thoughts:  Denies  Suicidal Plan/Intent:none  Hopelesness: no  Homicidal Thoughts:  absent      Medical Decision Making:   Labs:     Lab Results (last 24 hours)     ** No results found for the last 24 hours. **            Radiology:     Imaging Results (last 24 hours)     ** No results found for the last 24 hours. **            EKG:     ECG/EMG Results (most recent)     None           Medications:     acamprosate 666 mg Oral TID   ARIPiprazole 10 mg Oral Daily   divalproex 1,000 mg Oral Daily   nicotine 1 patch  Transdermal Q24H          All medications reviewed.      Assessment and Plan:    Bipolar II disorder  Plan: Continue Abilify and Depakote       Uncomplicated alcohol dependence  Plan: Campral        Suicide precautions: Suicide precaution Level 3 (q15 min checks    Continue hospitalization for safety and stabilization.  Continue current level of Special Precautions (q15 minute checks).

## 2017-10-22 PROCEDURE — 99232 SBSQ HOSP IP/OBS MODERATE 35: CPT | Performed by: PSYCHIATRY & NEUROLOGY

## 2017-10-22 RX ORDER — QUETIAPINE FUMARATE 100 MG/1
100 TABLET, FILM COATED ORAL NIGHTLY
Status: DISCONTINUED | OUTPATIENT
Start: 2017-10-22 | End: 2017-10-23

## 2017-10-22 RX ADMIN — ACAMPROSATE CALCIUM 666 MG: 333 TABLET, DELAYED RELEASE ORAL at 09:50

## 2017-10-22 RX ADMIN — ARIPIPRAZOLE 10 MG: 10 TABLET ORAL at 09:50

## 2017-10-22 RX ADMIN — ACAMPROSATE CALCIUM 666 MG: 333 TABLET, DELAYED RELEASE ORAL at 22:04

## 2017-10-22 RX ADMIN — DIVALPROEX SODIUM 1000 MG: 500 TABLET, FILM COATED, EXTENDED RELEASE ORAL at 09:52

## 2017-10-22 RX ADMIN — ACAMPROSATE CALCIUM 666 MG: 333 TABLET, DELAYED RELEASE ORAL at 17:13

## 2017-10-22 RX ADMIN — QUETIAPINE FUMARATE 100 MG: 100 TABLET ORAL at 22:04

## 2017-10-22 RX ADMIN — ALUMINUM HYDROXIDE, MAGNESIUM HYDROXIDE, AND DIMETHICONE 15 ML: 400; 400; 40 SUSPENSION ORAL at 12:00

## 2017-10-22 RX ADMIN — NICOTINE 1 PATCH: 21 PATCH TRANSDERMAL at 09:51

## 2017-10-22 NOTE — PROGRESS NOTES
Inpatient Psy Progress Note   Clinician: Davion Lord MD  Admission Date: 10/18/2017  7:53 AM 10/22/17    Behavioral Health Treatment Plan and Problem List: I have reviewed and approved the Behavioral Health Treatment Plan and Problem list.    Allergies  No Known Allergies    Hospital Day: 4 days      Assessment completed within view of staff    History  CC: inpatient followup  Interval HPI: Patient seen and evaluated by me.  Chart reviewed.  He c/o the Trazodone causing a lot of nightmares.  He reports previously taking Seroquel 200mg, which caused AM sedation, but wants to try 100mg.  He rates his current level of depression at a 4/10.  Anxiety 3/10.      Interval Review of Systems:   General ROS: negative for - fever or malaise  Endocrine ROS: negative for - palpitations  Respiratory ROS: no cough, shortness of breath, or wheezing  Cardiovascular ROS: no chest pain or dyspnea on exertion  Gastrointestinal ROS: no abdominal pain,no black or bloody stools    /71 (BP Location: Right arm, Patient Position: Lying)  Pulse 87  Temp 97.8 °F (36.6 °C) (Temporal Artery )   Resp 18  Wt 193 lb 6.4 oz (87.7 kg)  SpO2 99%  BMI 26.97 kg/m2    Mental Status Exam  Mood: depressed  Affect: constricted  Thought Processes: linear, logical, and goal directed  Thought Content:  Negativistic  Hallucinations: no  Suicidal Thoughts:  denies  Homicidal Thoughts:  absent      Medical Decision Making:   Labs:     Lab Results (last 24 hours)     ** No results found for the last 24 hours. **            Radiology:     Imaging Results (last 24 hours)     ** No results found for the last 24 hours. **            EKG:     ECG/EMG Results (most recent)     None           Medications:     acamprosate 666 mg Oral TID   ARIPiprazole 10 mg Oral Daily   divalproex 1,000 mg Oral Daily   nicotine 1 patch Transdermal Q24H          All medications reviewed.      Assessment and Plan:     Bipolar II disorder  Plan: Continue Abilify and  Depakote.  Add seroquel 100mg QHS for bipolar depression and insomnia.  D/C trazodone.        Uncomplicated alcohol dependence  Plan: Campral          Suicide precautions: Suicide precaution Level 3 (q15 min checks     Continue hospitalization for safety and stabilization.  Continue current level of Special Precautions (q15 minute checks).

## 2017-10-22 NOTE — PLAN OF CARE
Problem:  Patient Care Overview (Adult)  Goal: Plan of Care Review  Outcome: Ongoing (interventions implemented as appropriate)    10/22/17 1407   Coping/Psychosocial Response Interventions   Plan Of Care Reviewed With patient   Coping/Psychosocial   Patient Agreement with Plan of Care agrees   Patient Care Overview   Progress improving   Outcome Evaluation   Outcome Summary/Follow up Plan CALM AND COOPERATIVE DURING SHIFT, INTERACTS APPROPRIATELY WITH STAFF AND PEERS.         Problem:  Overarching Goals  Goal: Adheres to Safety Considerations for Self and Others  Outcome: Ongoing (interventions implemented as appropriate)  Goal: Optimized Coping Skills in Response to Life Stressors  Outcome: Ongoing (interventions implemented as appropriate)  Goal: Develops/Participates in Therapeutic [place] to Support Successful Transition  Outcome: Ongoing (interventions implemented as appropriate)

## 2017-10-22 NOTE — PLAN OF CARE
Problem: BH Patient Care Overview (Adult)  Goal: Plan of Care Review  Outcome: Ongoing (interventions implemented as appropriate)    10/22/17 0231   Coping/Psychosocial Response Interventions   Plan Of Care Reviewed With patient   Coping/Psychosocial   Patient Agreement with Plan of Care agrees   Patient Care Overview   Progress improving   Outcome Evaluation   Outcome Summary/Follow up Plan Pt cooperative this shift. Spent time in dayroom socializing with peers. Rates anxiety and depression both 4/10. Denies SI/HI and DARIELA.

## 2017-10-23 PROCEDURE — 99232 SBSQ HOSP IP/OBS MODERATE 35: CPT | Performed by: PSYCHIATRY & NEUROLOGY

## 2017-10-23 RX ORDER — QUETIAPINE FUMARATE 100 MG/1
200 TABLET, FILM COATED ORAL NIGHTLY
Status: DISCONTINUED | OUTPATIENT
Start: 2017-10-23 | End: 2017-10-24

## 2017-10-23 RX ORDER — PANTOPRAZOLE SODIUM 40 MG/1
40 TABLET, DELAYED RELEASE ORAL
Status: DISCONTINUED | OUTPATIENT
Start: 2017-10-23 | End: 2017-10-25 | Stop reason: HOSPADM

## 2017-10-23 RX ADMIN — QUETIAPINE FUMARATE 200 MG: 100 TABLET ORAL at 22:03

## 2017-10-23 RX ADMIN — ACAMPROSATE CALCIUM 666 MG: 333 TABLET, DELAYED RELEASE ORAL at 15:12

## 2017-10-23 RX ADMIN — DIVALPROEX SODIUM 1000 MG: 500 TABLET, FILM COATED, EXTENDED RELEASE ORAL at 08:15

## 2017-10-23 RX ADMIN — ARIPIPRAZOLE 10 MG: 10 TABLET ORAL at 08:15

## 2017-10-23 RX ADMIN — HYDROXYZINE HYDROCHLORIDE 50 MG: 50 TABLET ORAL at 01:18

## 2017-10-23 RX ADMIN — PANTOPRAZOLE SODIUM 40 MG: 40 TABLET, DELAYED RELEASE ORAL at 14:11

## 2017-10-23 RX ADMIN — ACAMPROSATE CALCIUM 666 MG: 333 TABLET, DELAYED RELEASE ORAL at 22:03

## 2017-10-23 RX ADMIN — ACAMPROSATE CALCIUM 666 MG: 333 TABLET, DELAYED RELEASE ORAL at 08:15

## 2017-10-23 NOTE — PLAN OF CARE
Problem:  Patient Care Overview (Adult)  Goal: Plan of Care Review  Outcome: Ongoing (interventions implemented as appropriate)    10/23/17 1226   Coping/Psychosocial Response Interventions   Plan Of Care Reviewed With patient   Coping/Psychosocial   Patient Agreement with Plan of Care agrees   Patient Care Overview   Progress no change   Outcome Evaluation   Outcome Summary/Follow up Plan CALM AND COOPERATIVE, INTERACTS APPROPRIATELY WITH STAFF AND PEERS. RATED ANXIETY AND DEPRESSION BOTH 8 ON SCALE OF 0-10.         Problem:  Overarching Goals  Goal: Adheres to Safety Considerations for Self and Others  Outcome: Ongoing (interventions implemented as appropriate)  Goal: Optimized Coping Skills in Response to Life Stressors  Outcome: Ongoing (interventions implemented as appropriate)  Goal: Develops/Participates in Therapeutic [place] to Support Successful Transition  Outcome: Ongoing (interventions implemented as appropriate)

## 2017-10-23 NOTE — PROGRESS NOTES
Navigator is helping primary therapist with discharge planning. Navigator Re faxed referrals with updated insurance information on this day.    [facility]  [phone]  Reviewing updated Insurance information. Navigator was asked to follow up with admissions tomorrow 10/24    [facility]   [phone]  Reviewing updated insurance.    [facility]   [phone]  Patients insurance is still showing Medicare Advantage plan. Patient will need to call insurance company to have them fax a statement that shows the advantage plan has termed.        [employer]  [phone]     [facility]  [phone]

## 2017-10-23 NOTE — PROGRESS NOTES
1030:    Therapist is attempting to assist patient with aftercare planning. Referrals have been delayed due to conflict with patient's insurance.  Contacted Radha with [employer].  She is now indicating that [employer] medicare replacement is likely cancelled and [place] [employer] is now primary. Therapist has asked navigators to resend residential referrals this date.  Patient agreeable. Patient remains at risk due to initial suicidal ideation and depression upon admission.  He is currently homeless and at risk for rapid relapse and/or rehospitalization.  Safest plan appears to be to assist patient in finding residential treatment if possible.  Therapist staffed with ruth Anderson to resend referrals this date.  Met 1-1 with patient and reviewed plan.  Patient agreeable this date.

## 2017-10-23 NOTE — PROGRESS NOTES
"INPATIENT PSYCHIATRIC PROGRESS NOTE    Name:  Kt Hernandez  :  1965  MRN:  [id number]  Visit Number:  [id number]  Length of stay:  5    SUBJECTIVE  CC: depression    INTERVAL HISTORY: The patient states that he was experiencing a lot of nightmares on trazodone and was started on Seroquel and trazodone stopped and he states that nightmares are no longer there but he is not sleeping good. He would like to take a higher dose of Seroquel and stop Abilify.     Depression rating 3/10  Anxiety rating 3/10  Sleep: poor  Withdrawal sx: None  Cravin/10    Review of Systems   Constitutional: Negative.    Respiratory: Negative.    Cardiovascular: Negative.    Gastrointestinal: Positive for abdominal pain.       OBJECTIVE    Temp:  [98.3 °F (36.8 °C)-98.5 °F (36.9 °C)] 98.3 °F (36.8 °C)  Heart Rate:  [84-89] 84  Resp:  [18-20] 18  BP: (120-128)/(78-83) 120/78    MENTAL STATUS EXAM:  Appearance:Casually dressed, good hygeine.   Cooperation:Cooperative  Psychomotor: No psychomotor agitation/retardation, No EPS, No motor tics  Speech-normal rate, amount.  Mood \"depresssed\"   Affect-congruent, appropriate, stable  Thought Content-goal directed, no delusional material present  Thought process-linear, organized.  Suicidality: No SI in the hospital, but doesn't feel safe leaving the hospital.  Homicidality: No HI  Perception: No AH/VH  Insight-fair   Judgement-fair    Lab Results (last 24 hours)     ** No results found for the last 24 hours. **             Imaging Results (last 24 hours)     ** No results found for the last 24 hours. **             ECG/EMG Results (most recent)     None           ALLERGIES: Review of patient's allergies indicates no known allergies.      Current Facility-Administered Medications:   •  acamprosate (CAMPRAL) EC tablet 666 mg, 666 mg, Oral, TID, Bel Boogie MD, 666 mg at 10/23/17 0815  •  aluminum-magnesium hydroxide-simethicone (MAALOX MAX) 400-400-40 MG/5ML suspension 15 mL, 15 " mL, Oral, Q6H PRN, Davion Lord MD, 15 mL at 10/22/17 1200  •  ARIPiprazole (ABILIFY) tablet 10 mg, 10 mg, Oral, Daily, Bel Boogie MD, 10 mg at 10/23/17 0815  •  benzonatate (TESSALON) capsule 100 mg, 100 mg, Oral, TID PRN, Davion Lord MD  •  benztropine (COGENTIN) tablet 1 mg, 1 mg, Oral, Daily PRN **OR** benztropine (COGENTIN) injection 0.5 mg, 0.5 mg, Intramuscular, Daily PRN, Davion Lord MD  •  divalproex (DEPAKOTE) 24 hr tablet 1,000 mg, 1,000 mg, Oral, Daily, Bel Boogie MD, 1,000 mg at 10/23/17 0815  •  hydrOXYzine (ATARAX) tablet 50 mg, 50 mg, Oral, Q6H PRN, Davion Lord MD, 50 mg at 10/23/17 0118  •  ibuprofen (ADVIL,MOTRIN) tablet 600 mg, 600 mg, Oral, Q6H PRN, Davino Lord MD, 600 mg at 10/21/17 1458  •  magnesium hydroxide (MILK OF MAGNESIA) suspension 2400 mg/10mL 10 mL, 10 mL, Oral, Daily PRN, Davion Lord MD  •  nicotine (NICODERM CQ) 21 MG/24HR patch 1 patch, 1 patch, Transdermal, Q24H, Davion Lord MD, 1 patch at 10/22/17 0951  •  QUEtiapine (SEROquel) tablet 100 mg, 100 mg, Oral, Nightly, Davion Lord MD, 100 mg at 10/22/17 2204  •  sodium chloride (OCEAN) nasal spray 2 spray, 2 spray, Each Nare, PRN, Davion Lord MD    ASSESSMENT & PLAN:    Active Problems:    Bipolar II disorder  Plan: Continue Depakote, stop Abilify, increase Seroquel 200 mg hs      Uncomplicated alcohol dependence  Plan: Campral      PUD  Plan: Start Protonix      Suicide precautions: Suicide precaution Level 3 (q15 min checks)     Behavioral Health Treatment Plan and Problem List: I have reviewed and approved the Behavioral Health Treatment Plan and Problem list.  The patient has had a chance to review and agrees with the treatment plan.     Clinician:  Bel Boogie MD  10/23/17  12:36 PM
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
## 2017-10-23 NOTE — PLAN OF CARE
Problem: BH Patient Care Overview (Adult)  Goal: Plan of Care Review  Outcome: Ongoing (interventions implemented as appropriate)    10/23/17 0123   Coping/Psychosocial Response Interventions   Plan Of Care Reviewed With patient   Coping/Psychosocial   Patient Agreement with Plan of Care agrees   Patient Care Overview   Progress no change   Outcome Evaluation   Outcome Summary/Follow up Plan Pt cooperative this shift. Spent time in dayroom socializing with peers. Rates anxiety and depression both 3/10. Denies SI/HI and DARIELA.

## 2017-10-24 PROCEDURE — 99232 SBSQ HOSP IP/OBS MODERATE 35: CPT | Performed by: PSYCHIATRY & NEUROLOGY

## 2017-10-24 RX ORDER — QUETIAPINE FUMARATE 300 MG/1
300 TABLET, FILM COATED ORAL NIGHTLY
Status: DISCONTINUED | OUTPATIENT
Start: 2017-10-24 | End: 2017-10-25 | Stop reason: HOSPADM

## 2017-10-24 RX ADMIN — DIVALPROEX SODIUM 1000 MG: 500 TABLET, FILM COATED, EXTENDED RELEASE ORAL at 08:13

## 2017-10-24 RX ADMIN — HYDROXYZINE HYDROCHLORIDE 50 MG: 50 TABLET ORAL at 15:23

## 2017-10-24 RX ADMIN — ACAMPROSATE CALCIUM 666 MG: 333 TABLET, DELAYED RELEASE ORAL at 22:19

## 2017-10-24 RX ADMIN — IBUPROFEN 600 MG: 600 TABLET ORAL at 15:23

## 2017-10-24 RX ADMIN — ACAMPROSATE CALCIUM 666 MG: 333 TABLET, DELAYED RELEASE ORAL at 08:14

## 2017-10-24 RX ADMIN — IBUPROFEN 600 MG: 600 TABLET ORAL at 02:24

## 2017-10-24 RX ADMIN — ACAMPROSATE CALCIUM 666 MG: 333 TABLET, DELAYED RELEASE ORAL at 15:00

## 2017-10-24 RX ADMIN — PANTOPRAZOLE SODIUM 40 MG: 40 TABLET, DELAYED RELEASE ORAL at 05:06

## 2017-10-24 RX ADMIN — HYDROXYZINE HYDROCHLORIDE 50 MG: 50 TABLET ORAL at 02:24

## 2017-10-24 RX ADMIN — QUETIAPINE FUMARATE 300 MG: 300 TABLET, FILM COATED ORAL at 22:19

## 2017-10-24 NOTE — PLAN OF CARE
Problem: BH Patient Care Overview (Adult)  Goal: Plan of Care Review  Outcome: Ongoing (interventions implemented as appropriate)    10/24/17 0631   Coping/Psychosocial Response Interventions   Plan Of Care Reviewed With patient   Coping/Psychosocial   Patient Agreement with Plan of Care agrees   Patient Care Overview   Progress improving   Outcome Evaluation   Outcome Summary/Follow up Plan Pt. stable and slept all night. Cooperative but occasionally irritable around other patients. Denies SI and HI.

## 2017-10-24 NOTE — PROGRESS NOTES
1352:      DATA:      Therapist met individually with patient this date. Staffed case with Dr. Boogie.  Discussed progress in treatment and any needs/concerns. Continue to assist patient with finding rehab and reviewed several barriers this date.  We have sent referrals to [facility] residential, [facility], [facility], and [facility].  Reviewed that all of these rehabs are showing that patient's primary insurance [employer] is still active and can not accept patient.  Requested patient contact [employer] to get statement regarding insurance being non-active.  They would not fax a statement, but agreed to mail it in 2-3 weeks. Thus, we are still at a stand still at finding patient residential treatment due to this insurance conflict.      Patient reports that he may return to [place] if he can't find rehab.  He reports that you can get a free ticket if you are homeless and have a current ID.  Therapist staffed with navigator Monica to see if this is an option.         ASSESSMENT:     Patient observed to have depressed/irritable affect and congruent mood.  Patient identifies frustration due to barriers with insurance and lack of options finding rehab.  Patient rates depression/anxiety at 10/10 this date.  Patient became visibly upset when discussing barriers and reports that he feels like getting help is difficult this date.  He is receptive to emotional support by treatment team.  Patient continues to be impacted by Bipolar II and Alcohol dependence which puts him at risk for rapid rehospitalization this date.     Plan:    Patient to remain hospitalized for safety.  Aftercare pending with residential referrals.

## 2017-10-24 NOTE — PROGRESS NOTES
"INPATIENT PSYCHIATRIC PROGRESS NOTE    Name:  Kt Hernandez  :  1965  MRN:  [id number]  Visit Number:  [id number]  Length of stay:  6    SUBJECTIVE  CC: depression    INTERVAL HISTORY: The patient states that he is trying to have his insurance coverage adjusted so that he may be able to go to rehab here in [place]. He states that his plan B is to go to a shelter or go back to [place]. He states that all this is making him more depressed and anxious.    Depression rating 10/10  Anxiety rating 10/10  Sleep: poor  Withdrawal sx: None  Cravin/10    Review of Systems   Constitutional: Negative.    Respiratory: Negative.    Cardiovascular: Negative.    Gastrointestinal: Positive for abdominal pain.       OBJECTIVE    Temp:  [97.6 °F (36.4 °C)-98.8 °F (37.1 °C)] 98.8 °F (37.1 °C)  Heart Rate:  [82-88] 82  Resp:  [18] 18  BP: (125-138)/(65-86) 138/65    MENTAL STATUS EXAM:  Appearance:Casually dressed, good hygeine.   Cooperation:Cooperative  Psychomotor: No psychomotor agitation/retardation, No EPS, No motor tics  Speech-normal rate, amount.  Mood \"depresssed\"   Affect-congruent, appropriate, stable  Thought Content-goal directed, no delusional material present  Thought process-linear, organized.  Suicidality: No SI in the hospital, but doesn't feel safe leaving the hospital.  Homicidality: No HI  Perception: No AH/VH  Insight-fair   Judgement-fair    Lab Results (last 24 hours)     ** No results found for the last 24 hours. **             Imaging Results (last 24 hours)     ** No results found for the last 24 hours. **             ECG/EMG Results (most recent)     None           ALLERGIES: Review of patient's allergies indicates no known allergies.      Current Facility-Administered Medications:   •  acamprosate (CAMPRAL) EC tablet 666 mg, 666 mg, Oral, TID, Bel Boogie MD, 666 mg at 10/24/17 0814  •  aluminum-magnesium hydroxide-simethicone (MAALOX MAX) 400-400-40 MG/5ML suspension 15 mL, 15 mL, Oral, " Q6H PRN, Davion Lord MD, 15 mL at 10/22/17 1200  •  benzonatate (TESSALON) capsule 100 mg, 100 mg, Oral, TID PRN, Davion Lord MD  •  benztropine (COGENTIN) tablet 1 mg, 1 mg, Oral, Daily PRN **OR** benztropine (COGENTIN) injection 0.5 mg, 0.5 mg, Intramuscular, Daily PRN, Davion Lord MD  •  divalproex (DEPAKOTE) 24 hr tablet 1,000 mg, 1,000 mg, Oral, Daily, Bel Boogie MD, 1,000 mg at 10/24/17 0813  •  hydrOXYzine (ATARAX) tablet 50 mg, 50 mg, Oral, Q6H PRN, Davion Lord MD, 50 mg at 10/24/17 0224  •  ibuprofen (ADVIL,MOTRIN) tablet 600 mg, 600 mg, Oral, Q6H PRN, Davion Lord MD, 600 mg at 10/24/17 0224  •  magnesium hydroxide (MILK OF MAGNESIA) suspension 2400 mg/10mL 10 mL, 10 mL, Oral, Daily PRN, Davion Lord MD  •  nicotine (NICODERM CQ) 21 MG/24HR patch 1 patch, 1 patch, Transdermal, Q24H, Davion Lord MD, 1 patch at 10/22/17 0951  •  pantoprazole (PROTONIX) EC tablet 40 mg, 40 mg, Oral, Q AM, Bel Boogie MD, 40 mg at 10/24/17 0506  •  QUEtiapine (SEROquel) tablet 200 mg, 200 mg, Oral, Nightly, Bel Boogie MD, 200 mg at 10/23/17 2203  •  sodium chloride (OCEAN) nasal spray 2 spray, 2 spray, Each Nare, PRN, Davion Lord MD    ASSESSMENT & PLAN:    Active Problems:    Bipolar II disorder  Plan: Continue Depakote, increase Seroquel 300 mg hs      Uncomplicated alcohol dependence  Plan: Campral      PUD  Plan: Protonix      Suicide precautions: Suicide precaution Level 3 (q15 min checks)     Behavioral Health Treatment Plan and Problem List: I have reviewed and approved the Behavioral Health Treatment Plan and Problem list.  The patient has had a chance to review and agrees with the treatment plan.     Clinician:  Bel Boogie MD  10/24/17  1:24 PM

## 2017-10-24 NOTE — PLAN OF CARE
Problem: BH Patient Care Overview (Adult)  Goal: Plan of Care Review  Outcome: Ongoing (interventions implemented as appropriate)    10/24/17 1722   Coping/Psychosocial Response Interventions   Plan Of Care Reviewed With patient   Coping/Psychosocial   Patient Agreement with Plan of Care agrees   Patient Care Overview   Progress improving   Outcome Evaluation   Outcome Summary/Follow up Plan improved

## 2017-10-24 NOTE — PLAN OF CARE
Problem: BH Patient Care Overview (Adult)  Goal: Plan of Care Review  Outcome: Ongoing (interventions implemented as appropriate)    10/24/17 1448   Coping/Psychosocial Response Interventions   Plan Of Care Reviewed With patient   Coping/Psychosocial   Patient Agreement with Plan of Care agrees   Patient Care Overview   Progress no change   Outcome Evaluation   Outcome Summary/Follow up Plan Pt in pleasant mood today, denies SI and hallucinations.

## 2017-10-25 VITALS
TEMPERATURE: 97.8 F | SYSTOLIC BLOOD PRESSURE: 132 MMHG | OXYGEN SATURATION: 100 % | BODY MASS INDEX: 26.97 KG/M2 | WEIGHT: 193.4 LBS | HEART RATE: 96 BPM | DIASTOLIC BLOOD PRESSURE: 82 MMHG | RESPIRATION RATE: 20 BRPM

## 2017-10-25 PROCEDURE — 99238 HOSP IP/OBS DSCHRG MGMT 30/<: CPT | Performed by: PSYCHIATRY & NEUROLOGY

## 2017-10-25 RX ORDER — ACAMPROSATE CALCIUM 333 MG/1
666 TABLET, DELAYED RELEASE ORAL 3 TIMES DAILY
Qty: 180 TABLET | Refills: 0 | Status: ON HOLD | OUTPATIENT
Start: 2017-10-25 | End: 2021-03-06

## 2017-10-25 RX ORDER — DIVALPROEX SODIUM 500 MG/1
1000 TABLET, EXTENDED RELEASE ORAL DAILY
Qty: 60 TABLET | Refills: 0 | Status: ON HOLD | OUTPATIENT
Start: 2017-10-26 | End: 2021-03-06

## 2017-10-25 RX ORDER — QUETIAPINE FUMARATE 300 MG/1
300 TABLET, FILM COATED ORAL NIGHTLY
Qty: 30 TABLET | Refills: 0 | Status: ON HOLD | OUTPATIENT
Start: 2017-10-25 | End: 2021-03-06

## 2017-10-25 RX ADMIN — PANTOPRAZOLE SODIUM 40 MG: 40 TABLET, DELAYED RELEASE ORAL at 05:06

## 2017-10-25 RX ADMIN — ACAMPROSATE CALCIUM 666 MG: 333 TABLET, DELAYED RELEASE ORAL at 09:18

## 2017-10-25 RX ADMIN — DIVALPROEX SODIUM 1000 MG: 500 TABLET, FILM COATED, EXTENDED RELEASE ORAL at 09:18

## 2017-10-25 RX ADMIN — HYDROXYZINE HYDROCHLORIDE 50 MG: 50 TABLET ORAL at 02:53

## 2017-10-25 NOTE — PLAN OF CARE
Problem: BH Patient Care Overview (Adult)  Goal: Plan of Care Review  Outcome: Ongoing (interventions implemented as appropriate)    10/25/17 0231   Coping/Psychosocial Response Interventions   Plan Of Care Reviewed With patient   Coping/Psychosocial   Patient Agreement with Plan of Care agrees   Patient Care Overview   Progress improving   Outcome Evaluation   Outcome Summary/Follow up Plan rates anxiety and depression an 8, denies si/hi/cate, interacting well with peers and staff, no new orders.

## 2017-10-25 NOTE — PROGRESS NOTES
"INPATIENT PSYCHIATRIC PROGRESS NOTE    Name:  Kt Hernandez  :  1965  MRN:  [id number]  Visit Number:  [id number]  Length of stay:  7    SUBJECTIVE  CC: depression    INTERVAL HISTORY: The patient states that he is getting frustrated with the whole situation with his insurance and is thinking about signing out. Denies any thoughts of harm to self or others.    Depression rating 6/10  Anxiety rating 6/10  Sleep: better  Withdrawal sx: None  Cravin/10    Review of Systems   Constitutional: Negative.    Respiratory: Negative.    Cardiovascular: Negative.    Gastrointestinal: Negative.        OBJECTIVE    Temp:  [97.2 °F (36.2 °C)-98.1 °F (36.7 °C)] 97.2 °F (36.2 °C)  Heart Rate:  [87-95] 95  Resp:  [18] 18  BP: (142)/(79-93) 142/93    MENTAL STATUS EXAM:  Appearance:Casually dressed, good hygeine.   Cooperation:Cooperative  Psychomotor: No psychomotor agitation/retardation, No EPS, No motor tics  Speech-normal rate, amount.  Mood \"depresssed\"   Affect-congruent, appropriate, stable  Thought Content-goal directed, no delusional material present  Thought process-linear, organized.  Suicidality: No SI  Homicidality: No HI  Perception: No AH/VH  Insight-fair   Judgement-fair    Lab Results (last 24 hours)     ** No results found for the last 24 hours. **             Imaging Results (last 24 hours)     ** No results found for the last 24 hours. **             ECG/EMG Results (most recent)     None           ALLERGIES: Review of patient's allergies indicates no known allergies.      Current Facility-Administered Medications:   •  acamprosate (CAMPRAL) EC tablet 666 mg, 666 mg, Oral, TID, Bel Boogie MD, 666 mg at 10/25/17 0918  •  aluminum-magnesium hydroxide-simethicone (MAALOX MAX) 400-400-40 MG/5ML suspension 15 mL, 15 mL, Oral, Q6H PRN, Davion Lord MD, 15 mL at 10/22/17 1200  •  benzonatate (TESSALON) capsule 100 mg, 100 mg, Oral, TID PRN, Davion Lord MD  •  benztropine (COGENTIN) " tablet 1 mg, 1 mg, Oral, Daily PRN **OR** benztropine (COGENTIN) injection 0.5 mg, 0.5 mg, Intramuscular, Daily PRN, Davion Lord MD  •  divalproex (DEPAKOTE) 24 hr tablet 1,000 mg, 1,000 mg, Oral, Daily, Bel Boogie MD, 1,000 mg at 10/25/17 0918  •  hydrOXYzine (ATARAX) tablet 50 mg, 50 mg, Oral, Q6H PRN, Davion Lord MD, 50 mg at 10/25/17 0253  •  ibuprofen (ADVIL,MOTRIN) tablet 600 mg, 600 mg, Oral, Q6H PRN, Davion Lord MD, 600 mg at 10/24/17 1523  •  magnesium hydroxide (MILK OF MAGNESIA) suspension 2400 mg/10mL 10 mL, 10 mL, Oral, Daily PRN, Davion Lord MD  •  nicotine (NICODERM CQ) 21 MG/24HR patch 1 patch, 1 patch, Transdermal, Q24H, Davion Lord MD, 1 patch at 10/22/17 0951  •  pantoprazole (PROTONIX) EC tablet 40 mg, 40 mg, Oral, Q AM, Bel Boogie MD, 40 mg at 10/25/17 0506  •  QUEtiapine (SEROquel) tablet 300 mg, 300 mg, Oral, Nightly, Bel Boogie MD, 300 mg at 10/24/17 2219  •  sodium chloride (OCEAN) nasal spray 2 spray, 2 spray, Each Nare, PRN, Davion Lord MD    ASSESSMENT & PLAN:    Active Problems:    Bipolar II disorder  Plan: Continue Depakote, Continue Seroquel 300 mg hs      Uncomplicated alcohol dependence  Plan: Campral      PUD  Plan: Protonix      Suicide precautions: Suicide precaution Level 3 (q15 min checks)     Behavioral Health Treatment Plan and Problem List: I have reviewed and approved the Behavioral Health Treatment Plan and Problem list.  The patient has had a chance to review and agrees with the treatment plan.     Clinician:  Bel Boogie MD  10/25/17  10:05 AM

## 2017-10-25 NOTE — DISCHARGE INSTR - APPOINTMENTS
[address] [place] [address]   [phone]  [phone] - 24/7 Help Line    October 30 2017 at 9:00am with Jaylene.

## 2017-10-25 NOTE — PROGRESS NOTES
Patient is coded to [facility]. They will release trip to [facility]. Confirmation # [id number]      [facility]: Patient is being discharged on this day.

## 2017-10-25 NOTE — PROGRESS NOTES
"1144:       DATA:      Therapist met individually with patient this date. Continue to provide much emotional support and education about patient's insurance issues and barriers to residential treatment. Therapist has been attempting patient daily with contacting our utilization review nurses and medassist to check on insurance.  Therapist has learned that [employer] Medicare replacement is still showing as active; until it is officially discontinued can not refer patient to [place] rehabs.     Therapist provided patient with information on free residential facilities such as [facility] in [facility] and [facility] in [place].  Allowed patient to use cellphone to make several phone calls to friends/family this date.  He spoke with several friends through the [employer], but none could provide resources.  Patient reports that he would like to return to his mother's home.  He declined to allow therapist to talk to her, but reports that he can return there at any time and denies issues of safety.  Patient reports that he will likely return home until he either returns to [place] or finds out his insurance is corrected here in [place] and then seek rehab.  He refused to enter [facility] or [facility] today.      ASSESSMENT:     Patient observed to have appropriate affect.  He identifies his mood \"good ready to go\" to therapist.   Denies SI/HI.  Denies acute symptoms such as hallucinations.  Patient observed to laugh and joke during assessment.  He reports that he is resourceful and has good credit.  He plans to have the [employer] help him with money or to get a small loan to get back to [place].  Patient appears to have lived a transient lifestyle for some time.  He appears calm and oriented x 4.  He denies other issues this date.  Patient does appear to be at risk for alcohol relapse due to his refusal to comply with local free rehabs.  He is receptive to outpatient referral and identifies intent to stay away from " alcohol.     Plan:    Patient requesting discharge today.  He will need public transport to his mother's address in [facility] on chart.  He has consented to a [facility] appointment and has been educated about case management and local free rehabs.      Assisted patient in identifying risk factors which would indicate the need for higher level of care including thoughts to harm self or others and/or self-harming behavior and encouraged patient to contact this office, call 911, or present to the nearest emergency room should any of these events occur. Discussed crisis intervention services and means to access.  Patient adamantly and convincingly denies current suicidal or homicidal ideation or perceptual disturbance. He has been educated about the risks associated with continued alcohol/drug use after discharge.      Patient does not appear to be criteria for involuntary hold or transfer this date.

## 2017-10-25 NOTE — DISCHARGE SUMMARY
"      PSYCHIATRIC DISCHARGE SUMMARY     Patient Identification:  Name:  Kt Hernandez  Age:  52 y.o.  Sex:  male  :  1965  MRN:  [id number]  Visit Number:  [id number]      Date of Admission:10/18/2017   Date of Discharge:  10/25/2017    Discharge Diagnosis:  Active Problems:    Bipolar II disorder    Uncomplicated alcohol dependence        Admission Diagnosis:  Major depression [F32.9]     Hospital Course  Patient is a 52 y.o. male presented with depression and suicidal ideations. He also admitted to a history of alcohol dependence and ongoing alcohol consumption. The patient was admitted to the [facility] unit for safety, further evaluation and treatment.  The patient reported that his medications were changed and that made him feel worse. He was monitored for alcohol withdrawals but he didn't exhibit any. He was started on Depakote and Abilify for his mood symptoms. He reported not doing too well on Abilify and also experienced nightmares on Trazodone. Abilify and Trazodone were stopped and he was started on Seroquel and dose was titrated up to 300 mg. He reported feeling better with the adjustment in his medications.  The patient was also able to take part in individual and group counseling sessions and work on appropriate coping skills.  The patient made steady improvement in his mood and expressed feeling more positive and hopeful about future. Sleep and appetite were improved.  He wanted to go to rehab but there was some delay due to his insurance coverage and he eventually decided to go back to his mother's house in [place].  The day of discharge the patient was calm, cooperative and pleasant. Mood was reported to be good, and denied SI/HI/AVH. Also reported no medication side effects.       Mental Status Exam upon discharge:   Mood \"good\"   Affect-congruent, appropriate, stable  Thought Content-goal directed, no delusional material present  Thought process-linear, " organized.  Suicidality: No SI  Homicidality: No HI  Perception: No AH/VH    Procedures Performed         Consults:   Consults     No orders found from 9/19/2017 to 10/19/2017.          Pertinent Test Results: CBC, CMP, UA, UDS were unremarkable    Condition on Discharge:  improved     Prognosis: Fair with treatment    Vital Signs  Temp:  [97.2 °F (36.2 °C)-98.1 °F (36.7 °C)] 97.2 °F (36.2 °C)  Heart Rate:  [87-95] 95  Resp:  [18] 18  BP: (142)/(79-93) 142/93      Discharge Disposition:  Home or Self Care    Discharge Medications:   Kt Hernandez   Home Medication Instructions [place]:[id number]    Printed on:10/25/17 1256   Medication Information                      acamprosate (CAMPRAL) 333 MG EC tablet  Take 2 tablets by mouth 3 (Three) Times a Day.             divalproex (DEPAKOTE) 500 MG 24 hr tablet  Take 2 tablets by mouth Daily.             QUEtiapine (SEROquel) 300 MG tablet  Take 1 tablet by mouth Every Night.                 Discharge Diet: Regular    Activity at Discharge: As tolerated    Follow-up Appointments  [facility]    Test Results Pending at Discharge      Clinician:   Bel Boogie MD  10/25/17  12:52 PM

## 2018-04-11 ENCOUNTER — HOSPITAL ENCOUNTER (EMERGENCY)
Facility: HOSPITAL | Age: 53
Discharge: HOME | End: 2018-04-11
Attending: EMERGENCY MEDICINE | Admitting: EMERGENCY MEDICINE

## 2018-04-11 VITALS
TEMPERATURE: 98.4 F | RESPIRATION RATE: 14 BRPM | OXYGEN SATURATION: 95 % | HEIGHT: 73 IN | HEART RATE: 108 BPM | SYSTOLIC BLOOD PRESSURE: 115 MMHG | BODY MASS INDEX: 25.18 KG/M2 | DIASTOLIC BLOOD PRESSURE: 81 MMHG | WEIGHT: 190 LBS

## 2018-04-11 DIAGNOSIS — T50.901A ACCIDENTAL DRUG OVERDOSE, INITIAL ENCOUNTER: Primary | ICD-10-CM

## 2018-04-11 LAB
ALBUMIN SERPL-MCNC: 4.8 G/DL (ref 3.5–5)
ALBUMIN/GLOB SERPL: 1.5 G/DL (ref 1–2)
ALP SERPL-CCNC: 70 U/L (ref 38–126)
ALT SERPL W P-5'-P-CCNC: 38 U/L (ref 13–69)
AMORPH URATE CRY URNS QL MICRO: ABNORMAL /HPF
AMPHET+METHAMPHET UR QL: NEGATIVE
AMPHETAMINES UR QL: POSITIVE
ANION GAP SERPL CALCULATED.3IONS-SCNC: 23.8 MMOL/L (ref 10–20)
APAP SERPL-MCNC: <10 MCG/ML
AST SERPL-CCNC: 32 U/L (ref 15–46)
BACTERIA UR QL AUTO: ABNORMAL /HPF
BARBITURATES UR QL SCN: NEGATIVE
BASOPHILS # BLD MANUAL: 0.14 10*3/MM3 (ref 0–0.2)
BASOPHILS NFR BLD AUTO: 1 % (ref 0–2.5)
BENZODIAZ UR QL SCN: NEGATIVE
BILIRUB SERPL-MCNC: 0.5 MG/DL (ref 0.2–1.3)
BILIRUB UR QL STRIP: NEGATIVE
BUN BLD-MCNC: 12 MG/DL (ref 7–20)
BUN/CREAT SERPL: 10 (ref 6.3–21.9)
BUPRENORPHINE SERPL-MCNC: NEGATIVE NG/ML
CALCIUM SPEC-SCNC: 9.3 MG/DL (ref 8.4–10.2)
CANNABINOIDS SERPL QL: NEGATIVE
CHLORIDE SERPL-SCNC: 102 MMOL/L (ref 98–107)
CLARITY UR: CLEAR
CO2 SERPL-SCNC: 22 MMOL/L (ref 26–30)
COCAINE UR QL: POSITIVE
COLOR UR: YELLOW
CREAT BLD-MCNC: 1.2 MG/DL (ref 0.6–1.3)
DEPRECATED RDW RBC AUTO: 46.2 FL (ref 37–54)
EOSINOPHIL # BLD MANUAL: 0.14 10*3/MM3 (ref 0–0.7)
EOSINOPHIL NFR BLD MANUAL: 1 % (ref 0–7)
ERYTHROCYTE [DISTWIDTH] IN BLOOD BY AUTOMATED COUNT: 14.4 % (ref 11.5–14.5)
ETHANOL BLD-MCNC: 195 MG/DL
ETHANOL UR QL: 0.2 %
GFR SERPL CREATININE-BSD FRML MDRD: 77 ML/MIN/1.73
GLOBULIN UR ELPH-MCNC: 3.1 GM/DL
GLUCOSE BLD-MCNC: 149 MG/DL (ref 74–98)
GLUCOSE UR STRIP-MCNC: NEGATIVE MG/DL
HCT VFR BLD AUTO: 41.6 % (ref 42–52)
HGB BLD-MCNC: 13.6 G/DL (ref 14–18)
HGB UR QL STRIP.AUTO: NEGATIVE
HOLD SPECIMEN: NORMAL
HOLD SPECIMEN: NORMAL
HYALINE CASTS UR QL AUTO: ABNORMAL /LPF
KETONES UR QL STRIP: NEGATIVE
LEUKOCYTE ESTERASE UR QL STRIP.AUTO: NEGATIVE
LYMPHOCYTES # BLD MANUAL: 6.19 10*3/MM3 (ref 0.6–3.4)
LYMPHOCYTES NFR BLD MANUAL: 11 % (ref 0–12)
LYMPHOCYTES NFR BLD MANUAL: 43 % (ref 10–50)
MCH RBC QN AUTO: 28.3 PG (ref 27–31)
MCHC RBC AUTO-ENTMCNC: 32.7 G/DL (ref 30–37)
MCV RBC AUTO: 86.5 FL (ref 80–94)
METHADONE UR QL SCN: NEGATIVE
MONOCYTES # BLD AUTO: 1.58 10*3/MM3 (ref 0–0.9)
NEUTROPHILS # BLD AUTO: 3.6 10*3/MM3 (ref 2–6.9)
NEUTROPHILS NFR BLD MANUAL: 24 % (ref 37–80)
NEUTS BAND NFR BLD MANUAL: 1 % (ref 0–6)
NITRITE UR QL STRIP: NEGATIVE
OPIATES UR QL: NEGATIVE
OXYCODONE UR QL SCN: NEGATIVE
PCP UR QL SCN: NEGATIVE
PH UR STRIP.AUTO: 5.5 [PH] (ref 5–8)
PLAT MORPH BLD: NORMAL
PLATELET # BLD AUTO: 250 10*3/MM3 (ref 130–400)
PMV BLD AUTO: 10.8 FL (ref 6–12)
POTASSIUM BLD-SCNC: 2.8 MMOL/L (ref 3.5–5.1)
PROPOXYPH UR QL: NEGATIVE
PROT SERPL-MCNC: 7.9 G/DL (ref 6.3–8.2)
PROT UR QL STRIP: ABNORMAL
RBC # BLD AUTO: 4.81 10*6/MM3 (ref 4.7–6.1)
RBC # UR: ABNORMAL /HPF
RBC MORPH BLD: NORMAL
REF LAB TEST METHOD: ABNORMAL
SALICYLATES SERPL-MCNC: <1 MG/DL (ref 2.8–20)
SCAN SLIDE: NORMAL
SMALL PLATELETS BLD QL SMEAR: ADEQUATE
SODIUM BLD-SCNC: 145 MMOL/L (ref 137–145)
SP GR UR STRIP: 1.02 (ref 1–1.03)
SQUAMOUS #/AREA URNS HPF: ABNORMAL /HPF
TRICYCLICS UR QL SCN: NEGATIVE
UROBILINOGEN UR QL STRIP: ABNORMAL
VARIANT LYMPHS NFR BLD MANUAL: 19 % (ref 0–0)
WBC MORPH BLD: NORMAL
WBC NRBC COR # BLD: 14.4 10*3/MM3 (ref 4.8–10.8)
WBC UR QL AUTO: ABNORMAL /HPF
WHOLE BLOOD HOLD SPECIMEN: NORMAL
WHOLE BLOOD HOLD SPECIMEN: NORMAL

## 2018-04-11 PROCEDURE — 82962 GLUCOSE BLOOD TEST: CPT

## 2018-04-11 PROCEDURE — 80306 DRUG TEST PRSMV INSTRMNT: CPT | Performed by: EMERGENCY MEDICINE

## 2018-04-11 PROCEDURE — 93005 ELECTROCARDIOGRAM TRACING: CPT | Performed by: EMERGENCY MEDICINE

## 2018-04-11 PROCEDURE — 81001 URINALYSIS AUTO W/SCOPE: CPT | Performed by: EMERGENCY MEDICINE

## 2018-04-11 PROCEDURE — 80307 DRUG TEST PRSMV CHEM ANLYZR: CPT | Performed by: EMERGENCY MEDICINE

## 2018-04-11 PROCEDURE — 85025 COMPLETE CBC W/AUTO DIFF WBC: CPT | Performed by: EMERGENCY MEDICINE

## 2018-04-11 PROCEDURE — 80053 COMPREHEN METABOLIC PANEL: CPT | Performed by: EMERGENCY MEDICINE

## 2018-04-11 PROCEDURE — 85007 BL SMEAR W/DIFF WBC COUNT: CPT | Performed by: EMERGENCY MEDICINE

## 2018-04-11 PROCEDURE — 85060 BLOOD SMEAR INTERPRETATION: CPT | Performed by: EMERGENCY MEDICINE

## 2018-04-11 PROCEDURE — 99284 EMERGENCY DEPT VISIT MOD MDM: CPT

## 2018-04-11 RX ORDER — POTASSIUM CHLORIDE 750 MG/1
40 CAPSULE, EXTENDED RELEASE ORAL ONCE
Status: COMPLETED | OUTPATIENT
Start: 2018-04-11 | End: 2018-04-11

## 2018-04-11 RX ORDER — SODIUM CHLORIDE 0.9 % (FLUSH) 0.9 %
10 SYRINGE (ML) INJECTION AS NEEDED
Status: DISCONTINUED | OUTPATIENT
Start: 2018-04-11 | End: 2018-04-11 | Stop reason: HOSPADM

## 2018-04-11 RX ADMIN — POTASSIUM CHLORIDE 40 MEQ: 750 CAPSULE, EXTENDED RELEASE ORAL at 16:55

## 2018-04-11 NOTE — ED PROVIDER NOTES
Catheter-associated bloodstream infection handout given.   Subjective   Patient dropped off on the doorstep of possible overdose people to drop him off to reduce given him a ride when he became unresponsive no other history obtainable        History limited by:  Patient unresponsive   used: No    Ingestion   Ingested substance:  Unable to specify  Witnesses present: no    Incident location:  Unable to specify  Associated symptoms: unresponsiveness        Review of Systems   Unable to perform ROS: Patient unresponsive   Constitutional: Positive for activity change.       Past Medical History:   Diagnosis Date   • Bipolar depression    • Depression    • Schizophrenia        No Known Allergies    No past surgical history on file.    No family history on file.    Social History     Social History   • Marital status: Single     Social History Main Topics   • Smoking status: Current Every Day Smoker     Packs/day: 0.50     Types: Cigarettes   • Smokeless tobacco: Never Used   • Alcohol use Yes   • Drug use:       Comment: heroin- sniffs     Other Topics Concern   • Not on file           Objective   Physical Exam   Constitutional: He appears well-developed and well-nourished. He appears ill. No distress. Face mask in place.   Patient unresponsive being bagged by the nurses upon my arrival in the room   HENT:   Head: Normocephalic and atraumatic.   Right Ear: External ear normal.   Left Ear: External ear normal.   Nose: Nose normal.   Eyes: Conjunctivae and EOM are normal.   Pupils constricted bilaterally   Neck: Normal range of motion. Neck supple. No JVD present. No tracheal deviation present.   Cardiovascular: Normal rate, regular rhythm and normal heart sounds.    No murmur heard.  Pulmonary/Chest: Effort normal and breath sounds normal. No respiratory distress. He has no wheezes.   Abdominal: Soft. Bowel sounds are normal. There is no tenderness.   Musculoskeletal: Normal range of motion. He exhibits no edema or deformity.   Neurological: He is  unresponsive. No cranial nerve deficit or sensory deficit. GCS eye subscore is 3. GCS verbal subscore is 2. GCS motor subscore is 4.   Skin: Skin is warm and dry. No rash noted. He is not diaphoretic. No erythema. No pallor.   Psychiatric: He is noncommunicative.   Nursing note and vitals reviewed.      Procedures         ED Course  ED Course    Patient initially unresponsive without spontaneous respirations total of 4 mg Narcan had been given IM that time we get IV access breathing on his own holding his O2 sats.  States he had a few beers and maybe a sniff heroin.  At 1750 Patient much more alert awake up walking around desiring to go home without any residual effects              MDM  Number of Diagnoses or Management Options  Accidental drug overdose, initial encounter: new and requires workup     Amount and/or Complexity of Data Reviewed  Clinical lab tests: ordered and reviewed  Tests in the radiology section of CPT®: ordered and reviewed  Tests in the medicine section of CPT®: ordered and reviewed  Independent visualization of images, tracings, or specimens: yes    Risk of Complications, Morbidity, and/or Mortality  Presenting problems: high  Diagnostic procedures: high  Management options: high    Critical Care  Total time providing critical care: 30-74 minutes (40 minutes)    Patient Progress  Patient progress: improved      Final diagnoses:   Accidental drug overdose, initial encounter            Neo Cortez MD  04/11/18 1800

## 2018-04-12 LAB
CYTOLOGIST CVX/VAG CYTO: NORMAL
PATH INTERP BLD-IMP: NORMAL

## 2018-04-13 LAB — GLUCOSE BLDC GLUCOMTR-MCNC: 97 MG/DL (ref 70–130)

## 2020-08-02 ENCOUNTER — HOSPITAL ENCOUNTER (EMERGENCY)
Facility: HOSPITAL | Age: 55
Discharge: HOME | End: 2020-08-02
Attending: EMERGENCY MEDICINE | Admitting: EMERGENCY MEDICINE

## 2020-08-02 VITALS
DIASTOLIC BLOOD PRESSURE: 88 MMHG | RESPIRATION RATE: 20 BRPM | OXYGEN SATURATION: 97 % | HEART RATE: 105 BPM | TEMPERATURE: 98.9 F | HEIGHT: 72 IN | BODY MASS INDEX: 29.12 KG/M2 | SYSTOLIC BLOOD PRESSURE: 124 MMHG | WEIGHT: 215 LBS

## 2020-08-02 DIAGNOSIS — Z91.14 NON COMPLIANCE W MEDICATION REGIMEN: ICD-10-CM

## 2020-08-02 DIAGNOSIS — R56.9 SEIZURE (HCC): Primary | ICD-10-CM

## 2020-08-02 LAB
ALBUMIN SERPL-MCNC: 4.8 G/DL (ref 3.5–5.2)
ALBUMIN/GLOB SERPL: 1.6 G/DL
ALP SERPL-CCNC: 83 U/L (ref 39–117)
ALT SERPL W P-5'-P-CCNC: 69 U/L (ref 1–41)
ANION GAP SERPL CALCULATED.3IONS-SCNC: 39.8 MMOL/L (ref 5–15)
AST SERPL-CCNC: 67 U/L (ref 1–40)
BASOPHILS # BLD AUTO: 0.09 10*3/MM3 (ref 0–0.2)
BASOPHILS NFR BLD AUTO: 0.7 % (ref 0–1.5)
BILIRUB SERPL-MCNC: 0.3 MG/DL (ref 0–1.2)
BUN SERPL-MCNC: 16 MG/DL (ref 6–20)
BUN/CREAT SERPL: 11.6 (ref 7–25)
CALCIUM SPEC-SCNC: 9.7 MG/DL (ref 8.6–10.5)
CHLORIDE SERPL-SCNC: 95 MMOL/L (ref 98–107)
CO2 SERPL-SCNC: 9.2 MMOL/L (ref 22–29)
CREAT SERPL-MCNC: 1.38 MG/DL (ref 0.76–1.27)
DEPRECATED RDW RBC AUTO: 50.3 FL (ref 37–54)
EOSINOPHIL # BLD AUTO: 0.04 10*3/MM3 (ref 0–0.4)
EOSINOPHIL NFR BLD AUTO: 0.3 % (ref 0.3–6.2)
ERYTHROCYTE [DISTWIDTH] IN BLOOD BY AUTOMATED COUNT: 15.6 % (ref 12.3–15.4)
ETHANOL BLD-MCNC: <10 MG/DL (ref 0–10)
ETHANOL UR QL: <0.01 %
GFR SERPL CREATININE-BSD FRML MDRD: 65 ML/MIN/1.73
GLOBULIN UR ELPH-MCNC: 3 GM/DL
GLUCOSE SERPL-MCNC: 165 MG/DL (ref 65–99)
HCT VFR BLD AUTO: 45 % (ref 37.5–51)
HGB BLD-MCNC: 14.6 G/DL (ref 13–17.7)
IMM GRANULOCYTES # BLD AUTO: 0.06 10*3/MM3 (ref 0–0.05)
IMM GRANULOCYTES NFR BLD AUTO: 0.4 % (ref 0–0.5)
LYMPHOCYTES # BLD AUTO: 5.2 10*3/MM3 (ref 0.7–3.1)
LYMPHOCYTES NFR BLD AUTO: 38.8 % (ref 19.6–45.3)
MCH RBC QN AUTO: 28.9 PG (ref 26.6–33)
MCHC RBC AUTO-ENTMCNC: 32.4 G/DL (ref 31.5–35.7)
MCV RBC AUTO: 89.1 FL (ref 79–97)
MONOCYTES # BLD AUTO: 1 10*3/MM3 (ref 0.1–0.9)
MONOCYTES NFR BLD AUTO: 7.5 % (ref 5–12)
NEUTROPHILS NFR BLD AUTO: 52.3 % (ref 42.7–76)
NEUTROPHILS NFR BLD AUTO: 7.02 10*3/MM3 (ref 1.7–7)
NRBC BLD AUTO-RTO: 0.4 /100 WBC (ref 0–0.2)
PLATELET # BLD AUTO: 210 10*3/MM3 (ref 140–450)
PMV BLD AUTO: 10.6 FL (ref 6–12)
POTASSIUM SERPL-SCNC: 3.9 MMOL/L (ref 3.5–5.2)
PROT SERPL-MCNC: 7.8 G/DL (ref 6–8.5)
RBC # BLD AUTO: 5.05 10*6/MM3 (ref 4.14–5.8)
SODIUM SERPL-SCNC: 144 MMOL/L (ref 136–145)
VALPROATE SERPL-MCNC: 4.6 MCG/ML (ref 50–125)
WBC # BLD AUTO: 13.41 10*3/MM3 (ref 3.4–10.8)

## 2020-08-02 PROCEDURE — 80307 DRUG TEST PRSMV CHEM ANLYZR: CPT | Performed by: EMERGENCY MEDICINE

## 2020-08-02 PROCEDURE — 80164 ASSAY DIPROPYLACETIC ACD TOT: CPT | Performed by: EMERGENCY MEDICINE

## 2020-08-02 PROCEDURE — 96365 THER/PROPH/DIAG IV INF INIT: CPT

## 2020-08-02 PROCEDURE — 80177 DRUG SCRN QUAN LEVETIRACETAM: CPT | Performed by: EMERGENCY MEDICINE

## 2020-08-02 PROCEDURE — 85025 COMPLETE CBC W/AUTO DIFF WBC: CPT | Performed by: EMERGENCY MEDICINE

## 2020-08-02 PROCEDURE — 96375 TX/PRO/DX INJ NEW DRUG ADDON: CPT

## 2020-08-02 PROCEDURE — 99284 EMERGENCY DEPT VISIT MOD MDM: CPT

## 2020-08-02 PROCEDURE — 80053 COMPREHEN METABOLIC PANEL: CPT | Performed by: EMERGENCY MEDICINE

## 2020-08-02 RX ORDER — METOPROLOL TARTRATE 5 MG/5ML
5 INJECTION INTRAVENOUS ONCE
Status: COMPLETED | OUTPATIENT
Start: 2020-08-02 | End: 2020-08-02

## 2020-08-02 RX ORDER — LEVETIRACETAM 500 MG/1
1500 TABLET ORAL ONCE
Status: COMPLETED | OUTPATIENT
Start: 2020-08-02 | End: 2020-08-02

## 2020-08-02 RX ADMIN — SODIUM CHLORIDE 1000 ML: 9 INJECTION, SOLUTION INTRAVENOUS at 14:09

## 2020-08-02 RX ADMIN — LEVETIRACETAM 1500 MG: 500 TABLET ORAL at 14:06

## 2020-08-02 RX ADMIN — VALPROATE SODIUM 2000 MG: 100 INJECTION, SOLUTION INTRAVENOUS at 14:20

## 2020-08-02 RX ADMIN — METOPROLOL TARTRATE 5 MG: 5 INJECTION, SOLUTION INTRAVENOUS at 14:10

## 2020-08-02 NOTE — ED PROVIDER NOTES
"Subjective   54-year-old male presenting with \"I think I had a seizure\".  Per family's report patient had about 1/22 generalized tonic-clonic seizure.  Seizure had stopped once EMS arrived but he was still having some generalized tremors so they administered 2 mg of Ativan.  Per family's report patient has been drinking alcohol for the last couple days and has not taken his seizure medications.  Patient has no complaints at this time.  There is no report of trauma.          Review of Systems   Constitutional: Negative.    HENT: Negative.    Eyes: Negative.    Respiratory: Negative.    Cardiovascular: Negative.    Gastrointestinal: Negative.    Genitourinary: Negative.    Musculoskeletal: Negative.    Skin: Negative.    Neurological: Positive for seizures.   Psychiatric/Behavioral: Negative.        Past Medical History:   Diagnosis Date   • Bipolar depression (CMS/HCC)    • Depression    • Diabetes mellitus (CMS/HCC)    • Hypertension    • Myocardial infarction (CMS/HCC)    • Schizophrenia (CMS/HCC)    • Seizures (CMS/HCC)        No Known Allergies    History reviewed. No pertinent surgical history.    History reviewed. No pertinent family history.    Social History     Socioeconomic History   • Marital status: Single     Spouse name: Not on file   • Number of children: Not on file   • Years of education: Not on file   • Highest education level: Not on file   Tobacco Use   • Smoking status: Current Every Day Smoker     Packs/day: 0.50     Types: Cigarettes   • Smokeless tobacco: Never Used   Substance and Sexual Activity   • Alcohol use: Yes   • Drug use: Not Currently     Comment: heroin- sniffs           Objective   Physical Exam   Constitutional: He is oriented to person, place, and time. He appears well-developed and well-nourished. No distress.   HENT:   Head: Normocephalic and atraumatic.   Right Ear: External ear normal.   Left Ear: External ear normal.   Nose: Nose normal.   Mouth/Throat: Oropharynx is clear " and moist.   Contusion left side of tongue   Eyes: Pupils are equal, round, and reactive to light. Conjunctivae and EOM are normal.   Neck: Normal range of motion. Neck supple.   Cardiovascular: Regular rhythm, normal heart sounds and intact distal pulses.   Tachycardic   Pulmonary/Chest: Effort normal and breath sounds normal. No respiratory distress.   Abdominal: Soft. Bowel sounds are normal. He exhibits no distension. There is no tenderness. There is no rebound and no guarding.   Musculoskeletal: Normal range of motion. He exhibits no edema, tenderness or deformity.   Neurological: He is alert and oriented to person, place, and time.   Normal strength and sensation bilateral upper and lower extremities, cranial nerves intact   Skin: Skin is warm and dry. No rash noted.   Psychiatric: He has a normal mood and affect. His behavior is normal.   Nursing note and vitals reviewed.      Procedures           ED Course                                           MDM  Number of Diagnoses or Management Options  Non compliance w medication regimen:   Seizure (CMS/[facility]):   Diagnosis management comments: 54-year-old male with reported seizure.  Well-developed, well-nourished man in no distress with exam as above.  His neurologic exam is nonfocal.  He is tachycardic.  His exam is otherwise nonfocal.  Will obtain basic labs, will give IV fluids.  He has already had Ativan.  Will likely reload his Keppra and valproic acid.  Disposition pending.    DDX: seizure, medication non compliance, alcohol abuse    Lab work reveals mild leukocytosis, renal insufficiency, transaminitis.  These are all fairly similar to previous results.  He is remained seizure-free here.  I have loaded him with Keppra and valproic acid.  I also gave him a dose of metoprolol since he has missed that the last couple days, his heart rate has improved.  I do feel he is safe for discharge home.  He is in agreement with plan of care.       Amount and/or Complexity  of Data Reviewed  Clinical lab tests: reviewed  Decide to obtain previous medical records or to obtain history from someone other than the patient: yes        Final diagnoses:   Seizure (CMS/HCC)   Non compliance w medication regimen            Timothy Alegria MD  08/02/20 0616

## 2020-08-02 NOTE — DISCHARGE INSTRUCTIONS
Please take all your medications as prescribed.  Follow-up with your primary care doctor, if you do not have a primary care doctor follow-up with the [facility].  You should also follow-up with neurology.

## 2020-08-03 LAB — LEVETIRACETAM SERPL-MCNC: NORMAL UG/ML

## 2020-08-28 ENCOUNTER — APPOINTMENT (OUTPATIENT)
Dept: CT IMAGING | Facility: HOSPITAL | Age: 55
End: 2020-08-28

## 2020-08-28 ENCOUNTER — HOSPITAL ENCOUNTER (EMERGENCY)
Facility: HOSPITAL | Age: 55
Discharge: HOME | End: 2020-08-28
Attending: EMERGENCY MEDICINE | Admitting: EMERGENCY MEDICINE

## 2020-08-28 VITALS
BODY MASS INDEX: 28 KG/M2 | HEART RATE: 118 BPM | OXYGEN SATURATION: 95 % | HEIGHT: 71 IN | TEMPERATURE: 98.9 F | DIASTOLIC BLOOD PRESSURE: 72 MMHG | WEIGHT: 200 LBS | SYSTOLIC BLOOD PRESSURE: 116 MMHG | RESPIRATION RATE: 18 BRPM

## 2020-08-28 DIAGNOSIS — R25.1 OCCASIONAL TREMORS: Primary | ICD-10-CM

## 2020-08-28 LAB
ALBUMIN SERPL-MCNC: 4 G/DL (ref 3.5–5.2)
ALBUMIN/GLOB SERPL: 1.7 G/DL
ALP SERPL-CCNC: 68 U/L (ref 39–117)
ALT SERPL W P-5'-P-CCNC: 45 U/L (ref 1–41)
AMPHET+METHAMPHET UR QL: NEGATIVE
AMPHETAMINES UR QL: NEGATIVE
ANION GAP SERPL CALCULATED.3IONS-SCNC: 20.6 MMOL/L (ref 5–15)
AST SERPL-CCNC: 45 U/L (ref 1–40)
BARBITURATES UR QL SCN: NEGATIVE
BASOPHILS # BLD AUTO: 0.03 10*3/MM3 (ref 0–0.2)
BASOPHILS NFR BLD AUTO: 0.4 % (ref 0–1.5)
BENZODIAZ UR QL SCN: POSITIVE
BILIRUB SERPL-MCNC: 0.2 MG/DL (ref 0–1.2)
BUN SERPL-MCNC: 11 MG/DL (ref 6–20)
BUN/CREAT SERPL: 10.2 (ref 7–25)
BUPRENORPHINE SERPL-MCNC: NEGATIVE NG/ML
CALCIUM SPEC-SCNC: 9.4 MG/DL (ref 8.6–10.5)
CANNABINOIDS SERPL QL: NEGATIVE
CHLORIDE SERPL-SCNC: 99 MMOL/L (ref 98–107)
CO2 SERPL-SCNC: 20.4 MMOL/L (ref 22–29)
COCAINE UR QL: NEGATIVE
CREAT SERPL-MCNC: 1.08 MG/DL (ref 0.76–1.27)
DEPRECATED RDW RBC AUTO: 53.1 FL (ref 37–54)
EOSINOPHIL # BLD AUTO: 0.03 10*3/MM3 (ref 0–0.4)
EOSINOPHIL NFR BLD AUTO: 0.4 % (ref 0.3–6.2)
ERYTHROCYTE [DISTWIDTH] IN BLOOD BY AUTOMATED COUNT: 17.2 % (ref 12.3–15.4)
ETHANOL BLD-MCNC: <10 MG/DL (ref 0–10)
ETHANOL UR QL: <0.01 %
GFR SERPL CREATININE-BSD FRML MDRD: 87 ML/MIN/1.73
GLOBULIN UR ELPH-MCNC: 2.3 GM/DL
GLUCOSE SERPL-MCNC: 111 MG/DL (ref 65–99)
HCT VFR BLD AUTO: 37.2 % (ref 37.5–51)
HGB BLD-MCNC: 12.7 G/DL (ref 13–17.7)
HOLD SPECIMEN: NORMAL
HOLD SPECIMEN: NORMAL
IMM GRANULOCYTES # BLD AUTO: 0.06 10*3/MM3 (ref 0–0.05)
IMM GRANULOCYTES NFR BLD AUTO: 0.8 % (ref 0–0.5)
LYMPHOCYTES # BLD AUTO: 1.28 10*3/MM3 (ref 0.7–3.1)
LYMPHOCYTES NFR BLD AUTO: 17.9 % (ref 19.6–45.3)
MAGNESIUM SERPL-MCNC: 1.4 MG/DL (ref 1.6–2.6)
MCH RBC QN AUTO: 29.3 PG (ref 26.6–33)
MCHC RBC AUTO-ENTMCNC: 34.1 G/DL (ref 31.5–35.7)
MCV RBC AUTO: 85.7 FL (ref 79–97)
METHADONE UR QL SCN: NEGATIVE
MONOCYTES # BLD AUTO: 0.67 10*3/MM3 (ref 0.1–0.9)
MONOCYTES NFR BLD AUTO: 9.4 % (ref 5–12)
NEUTROPHILS NFR BLD AUTO: 5.09 10*3/MM3 (ref 1.7–7)
NEUTROPHILS NFR BLD AUTO: 71.1 % (ref 42.7–76)
NRBC BLD AUTO-RTO: 0 /100 WBC (ref 0–0.2)
OPIATES UR QL: NEGATIVE
OXYCODONE UR QL SCN: NEGATIVE
PCP UR QL SCN: NEGATIVE
PLATELET # BLD AUTO: 173 10*3/MM3 (ref 140–450)
PMV BLD AUTO: 10.4 FL (ref 6–12)
POTASSIUM SERPL-SCNC: 4.6 MMOL/L (ref 3.5–5.2)
PROPOXYPH UR QL: NEGATIVE
PROT SERPL-MCNC: 6.3 G/DL (ref 6–8.5)
RBC # BLD AUTO: 4.34 10*6/MM3 (ref 4.14–5.8)
SODIUM SERPL-SCNC: 140 MMOL/L (ref 136–145)
TRICYCLICS UR QL SCN: NEGATIVE
TROPONIN T SERPL-MCNC: <0.01 NG/ML (ref 0–0.03)
VALPROATE SERPL-MCNC: 44.7 MCG/ML (ref 50–125)
WBC # BLD AUTO: 7.16 10*3/MM3 (ref 3.4–10.8)
WHOLE BLOOD HOLD SPECIMEN: NORMAL
WHOLE BLOOD HOLD SPECIMEN: NORMAL

## 2020-08-28 PROCEDURE — 96374 THER/PROPH/DIAG INJ IV PUSH: CPT

## 2020-08-28 PROCEDURE — 93005 ELECTROCARDIOGRAM TRACING: CPT | Performed by: EMERGENCY MEDICINE

## 2020-08-28 PROCEDURE — 80053 COMPREHEN METABOLIC PANEL: CPT | Performed by: PHYSICIAN ASSISTANT

## 2020-08-28 PROCEDURE — 25010000002 LORAZEPAM PER 2 MG: Performed by: EMERGENCY MEDICINE

## 2020-08-28 PROCEDURE — 70450 CT HEAD/BRAIN W/O DYE: CPT

## 2020-08-28 PROCEDURE — 93005 ELECTROCARDIOGRAM TRACING: CPT

## 2020-08-28 PROCEDURE — 80164 ASSAY DIPROPYLACETIC ACD TOT: CPT | Performed by: PHYSICIAN ASSISTANT

## 2020-08-28 PROCEDURE — 84484 ASSAY OF TROPONIN QUANT: CPT | Performed by: PHYSICIAN ASSISTANT

## 2020-08-28 PROCEDURE — 83735 ASSAY OF MAGNESIUM: CPT | Performed by: PHYSICIAN ASSISTANT

## 2020-08-28 PROCEDURE — 85025 COMPLETE CBC W/AUTO DIFF WBC: CPT | Performed by: PHYSICIAN ASSISTANT

## 2020-08-28 PROCEDURE — 99283 EMERGENCY DEPT VISIT LOW MDM: CPT

## 2020-08-28 PROCEDURE — 80307 DRUG TEST PRSMV CHEM ANLYZR: CPT | Performed by: PHYSICIAN ASSISTANT

## 2020-08-28 RX ORDER — LORAZEPAM 2 MG/ML
1 INJECTION INTRAMUSCULAR ONCE
Status: COMPLETED | OUTPATIENT
Start: 2020-08-28 | End: 2020-08-28

## 2020-08-28 RX ORDER — SODIUM CHLORIDE 0.9 % (FLUSH) 0.9 %
10 SYRINGE (ML) INJECTION AS NEEDED
Status: DISCONTINUED | OUTPATIENT
Start: 2020-08-28 | End: 2020-08-28 | Stop reason: HOSPADM

## 2020-08-28 RX ADMIN — LORAZEPAM 1 MG: 2 INJECTION, SOLUTION INTRAMUSCULAR; INTRAVENOUS at 15:25

## 2020-10-23 ENCOUNTER — HOSPITAL ENCOUNTER (EMERGENCY)
Facility: HOSPITAL | Age: 55
Discharge: HOME | End: 2020-10-24
Attending: EMERGENCY MEDICINE | Admitting: EMERGENCY MEDICINE

## 2020-10-23 DIAGNOSIS — E83.42 HYPOMAGNESEMIA: Primary | ICD-10-CM

## 2020-10-23 DIAGNOSIS — N39.0 URINARY TRACT INFECTION WITHOUT HEMATURIA, SITE UNSPECIFIED: ICD-10-CM

## 2020-10-23 DIAGNOSIS — R25.1 TREMOR: ICD-10-CM

## 2020-10-23 LAB
ALBUMIN SERPL-MCNC: 4.4 G/DL (ref 3.5–5.2)
ALBUMIN/GLOB SERPL: 2 G/DL
ALP SERPL-CCNC: 65 U/L (ref 39–117)
ALT SERPL W P-5'-P-CCNC: 31 U/L (ref 1–41)
AMPHET+METHAMPHET UR QL: NEGATIVE
AMPHETAMINES UR QL: NEGATIVE
ANION GAP SERPL CALCULATED.3IONS-SCNC: 28.4 MMOL/L (ref 5–15)
AST SERPL-CCNC: 41 U/L (ref 1–40)
BACTERIA UR QL AUTO: ABNORMAL /HPF
BARBITURATES UR QL SCN: NEGATIVE
BASOPHILS # BLD AUTO: 0.02 10*3/MM3 (ref 0–0.2)
BASOPHILS NFR BLD AUTO: 0.3 % (ref 0–1.5)
BENZODIAZ UR QL SCN: NEGATIVE
BILIRUB SERPL-MCNC: 0.3 MG/DL (ref 0–1.2)
BILIRUB UR QL STRIP: NEGATIVE
BUN SERPL-MCNC: 11 MG/DL (ref 6–20)
BUN/CREAT SERPL: 9.8 (ref 7–25)
BUPRENORPHINE SERPL-MCNC: NEGATIVE NG/ML
CALCIUM SPEC-SCNC: 9.2 MG/DL (ref 8.6–10.5)
CANNABINOIDS SERPL QL: NEGATIVE
CHLORIDE SERPL-SCNC: 95 MMOL/L (ref 98–107)
CLARITY UR: CLEAR
CO2 SERPL-SCNC: 14.6 MMOL/L (ref 22–29)
COCAINE UR QL: NEGATIVE
COLOR UR: YELLOW
CREAT SERPL-MCNC: 1.12 MG/DL (ref 0.76–1.27)
DEPRECATED RDW RBC AUTO: 53.5 FL (ref 37–54)
EOSINOPHIL # BLD AUTO: 0.08 10*3/MM3 (ref 0–0.4)
EOSINOPHIL NFR BLD AUTO: 1.1 % (ref 0.3–6.2)
ERYTHROCYTE [DISTWIDTH] IN BLOOD BY AUTOMATED COUNT: 16.7 % (ref 12.3–15.4)
ETHANOL BLD-MCNC: <10 MG/DL (ref 0–10)
ETHANOL UR QL: <0.01 %
GFR SERPL CREATININE-BSD FRML MDRD: 83 ML/MIN/1.73
GLOBULIN UR ELPH-MCNC: 2.2 GM/DL
GLUCOSE SERPL-MCNC: 81 MG/DL (ref 65–99)
GLUCOSE UR STRIP-MCNC: NEGATIVE MG/DL
HCT VFR BLD AUTO: 35 % (ref 37.5–51)
HGB BLD-MCNC: 11.7 G/DL (ref 13–17.7)
HGB UR QL STRIP.AUTO: NEGATIVE
HOLD SPECIMEN: NORMAL
HOLD SPECIMEN: NORMAL
HYALINE CASTS UR QL AUTO: ABNORMAL /LPF
IMM GRANULOCYTES # BLD AUTO: 0.04 10*3/MM3 (ref 0–0.05)
IMM GRANULOCYTES NFR BLD AUTO: 0.6 % (ref 0–0.5)
KETONES UR QL STRIP: ABNORMAL
LEUKOCYTE ESTERASE UR QL STRIP.AUTO: ABNORMAL
LYMPHOCYTES # BLD AUTO: 2.41 10*3/MM3 (ref 0.7–3.1)
LYMPHOCYTES NFR BLD AUTO: 34 % (ref 19.6–45.3)
MAGNESIUM SERPL-MCNC: 0.9 MG/DL (ref 1.6–2.6)
MCH RBC QN AUTO: 29.7 PG (ref 26.6–33)
MCHC RBC AUTO-ENTMCNC: 33.4 G/DL (ref 31.5–35.7)
MCV RBC AUTO: 88.8 FL (ref 79–97)
METHADONE UR QL SCN: NEGATIVE
MONOCYTES # BLD AUTO: 0.91 10*3/MM3 (ref 0.1–0.9)
MONOCYTES NFR BLD AUTO: 12.9 % (ref 5–12)
NEUTROPHILS NFR BLD AUTO: 3.62 10*3/MM3 (ref 1.7–7)
NEUTROPHILS NFR BLD AUTO: 51.1 % (ref 42.7–76)
NITRITE UR QL STRIP: NEGATIVE
NRBC BLD AUTO-RTO: 0.3 /100 WBC (ref 0–0.2)
OPIATES UR QL: NEGATIVE
OXYCODONE UR QL SCN: NEGATIVE
PCP UR QL SCN: NEGATIVE
PH UR STRIP.AUTO: 5.5 [PH] (ref 5–8)
PLATELET # BLD AUTO: 197 10*3/MM3 (ref 140–450)
PMV BLD AUTO: 11 FL (ref 6–12)
POTASSIUM SERPL-SCNC: 4.2 MMOL/L (ref 3.5–5.2)
PROCALCITONIN SERPL-MCNC: 0.09 NG/ML (ref 0–0.25)
PROPOXYPH UR QL: NEGATIVE
PROT SERPL-MCNC: 6.6 G/DL (ref 6–8.5)
PROT UR QL STRIP: ABNORMAL
RBC # BLD AUTO: 3.94 10*6/MM3 (ref 4.14–5.8)
RBC # UR: ABNORMAL /HPF
REF LAB TEST METHOD: ABNORMAL
SODIUM SERPL-SCNC: 138 MMOL/L (ref 136–145)
SP GR UR STRIP: 1.03 (ref 1–1.03)
SQUAMOUS #/AREA URNS HPF: ABNORMAL /HPF
TRICYCLICS UR QL SCN: NEGATIVE
UROBILINOGEN UR QL STRIP: ABNORMAL
VALPROATE SERPL-MCNC: 45.1 MCG/ML (ref 50–125)
WBC # BLD AUTO: 7.08 10*3/MM3 (ref 3.4–10.8)
WBC UR QL AUTO: ABNORMAL /HPF
WHOLE BLOOD HOLD SPECIMEN: NORMAL
WHOLE BLOOD HOLD SPECIMEN: NORMAL

## 2020-10-23 PROCEDURE — 25010000002 MAGNESIUM SULFATE 2 GM/50ML SOLUTION: Performed by: EMERGENCY MEDICINE

## 2020-10-23 PROCEDURE — 96365 THER/PROPH/DIAG IV INF INIT: CPT

## 2020-10-23 PROCEDURE — 80053 COMPREHEN METABOLIC PANEL: CPT | Performed by: EMERGENCY MEDICINE

## 2020-10-23 PROCEDURE — 25010000002 CEFTRIAXONE SODIUM-DEXTROSE 1-3.74 GM-%(50ML) RECONSTITUTED SOLUTION: Performed by: EMERGENCY MEDICINE

## 2020-10-23 PROCEDURE — 99283 EMERGENCY DEPT VISIT LOW MDM: CPT

## 2020-10-23 PROCEDURE — 81001 URINALYSIS AUTO W/SCOPE: CPT | Performed by: EMERGENCY MEDICINE

## 2020-10-23 PROCEDURE — 80164 ASSAY DIPROPYLACETIC ACD TOT: CPT | Performed by: EMERGENCY MEDICINE

## 2020-10-23 PROCEDURE — 93005 ELECTROCARDIOGRAM TRACING: CPT | Performed by: EMERGENCY MEDICINE

## 2020-10-23 PROCEDURE — 96367 TX/PROPH/DG ADDL SEQ IV INF: CPT

## 2020-10-23 PROCEDURE — 80307 DRUG TEST PRSMV CHEM ANLYZR: CPT | Performed by: EMERGENCY MEDICINE

## 2020-10-23 PROCEDURE — 84145 PROCALCITONIN (PCT): CPT | Performed by: EMERGENCY MEDICINE

## 2020-10-23 PROCEDURE — 85025 COMPLETE CBC W/AUTO DIFF WBC: CPT | Performed by: EMERGENCY MEDICINE

## 2020-10-23 PROCEDURE — 83735 ASSAY OF MAGNESIUM: CPT | Performed by: EMERGENCY MEDICINE

## 2020-10-23 PROCEDURE — 87086 URINE CULTURE/COLONY COUNT: CPT | Performed by: EMERGENCY MEDICINE

## 2020-10-23 RX ORDER — CEFTRIAXONE 1 G/50ML
1 INJECTION, SOLUTION INTRAVENOUS ONCE
Status: COMPLETED | OUTPATIENT
Start: 2020-10-23 | End: 2020-10-24

## 2020-10-23 RX ORDER — LEVETIRACETAM 500 MG/1
500 TABLET ORAL 2 TIMES DAILY
Status: ON HOLD | COMMUNITY
End: 2021-03-11 | Stop reason: SDUPTHER

## 2020-10-23 RX ORDER — BENZTROPINE MESYLATE 1 MG/1
1 TABLET ORAL 2 TIMES DAILY
Status: ON HOLD | COMMUNITY
End: 2021-03-06

## 2020-10-23 RX ORDER — VALPROIC ACID 250 MG/1
250 CAPSULE, LIQUID FILLED ORAL ONCE
Status: COMPLETED | OUTPATIENT
Start: 2020-10-23 | End: 2020-10-23

## 2020-10-23 RX ORDER — MAGNESIUM SULFATE HEPTAHYDRATE 40 MG/ML
4 INJECTION, SOLUTION INTRAVENOUS ONCE
Status: COMPLETED | OUTPATIENT
Start: 2020-10-23 | End: 2020-10-23

## 2020-10-23 RX ORDER — ATORVASTATIN CALCIUM 20 MG/1
20 TABLET, FILM COATED ORAL DAILY
Status: ON HOLD | COMMUNITY
End: 2021-03-11 | Stop reason: SDUPTHER

## 2020-10-23 RX ORDER — PANTOPRAZOLE SODIUM 20 MG/1
20 TABLET, DELAYED RELEASE ORAL 2 TIMES DAILY
Status: ON HOLD | COMMUNITY
End: 2021-03-06

## 2020-10-23 RX ADMIN — CEFTRIAXONE 1 G: 1 INJECTION, SOLUTION INTRAVENOUS at 23:34

## 2020-10-23 RX ADMIN — SODIUM CHLORIDE 1000 ML: 9 INJECTION, SOLUTION INTRAVENOUS at 22:31

## 2020-10-23 RX ADMIN — VALPROIC ACID 250 MG: 250 CAPSULE, LIQUID FILLED ORAL at 23:34

## 2020-10-23 RX ADMIN — MAGNESIUM SULFATE IN WATER 4 G: 40 INJECTION, SOLUTION INTRAVENOUS at 21:22

## 2020-10-24 VITALS
DIASTOLIC BLOOD PRESSURE: 82 MMHG | HEART RATE: 90 BPM | HEIGHT: 71 IN | TEMPERATURE: 97.5 F | SYSTOLIC BLOOD PRESSURE: 127 MMHG | BODY MASS INDEX: 25.2 KG/M2 | OXYGEN SATURATION: 97 % | WEIGHT: 180 LBS | RESPIRATION RATE: 18 BRPM

## 2020-10-24 LAB — MAGNESIUM SERPL-MCNC: 2.7 MG/DL (ref 1.6–2.6)

## 2020-10-24 PROCEDURE — 83735 ASSAY OF MAGNESIUM: CPT | Performed by: EMERGENCY MEDICINE

## 2020-10-24 PROCEDURE — 25010000002 THIAMINE PER 100 MG: Performed by: EMERGENCY MEDICINE

## 2020-10-24 PROCEDURE — 96367 TX/PROPH/DG ADDL SEQ IV INF: CPT

## 2020-10-24 PROCEDURE — 25010000002 MAGNESIUM SULFATE PER 500 MG OF MAGNESIUM: Performed by: EMERGENCY MEDICINE

## 2020-10-24 RX ORDER — CEPHALEXIN 500 MG/1
500 CAPSULE ORAL 2 TIMES DAILY
Qty: 14 CAPSULE | Refills: 0 | Status: ON HOLD | OUTPATIENT
Start: 2020-10-24 | End: 2021-03-06

## 2020-10-24 RX ADMIN — FOLIC ACID 1000 ML/HR: 5 INJECTION, SOLUTION INTRAMUSCULAR; INTRAVENOUS; SUBCUTANEOUS at 00:14

## 2020-10-24 NOTE — ED PROVIDER NOTES
TRIAGE CHIEF COMPLAINT:     Nursing and triage notes reviewed    Chief Complaint   Patient presents with   • Tremors      HPI: Kt Hernandez is a 55 y.o. male who presents to the emergency department complaining of tremors.  Patient states he has a history of chronic tremors.  Patient states he does take medicine for this but is unsure exactly what he takes.  He does take Depakote for seizures as well.  Patient states he has relatively recently moved to the area.  States that the tremors are becoming more violent and thinks that he needs to see a doctor in the area to get some of his medication adjusted.  He denies any fevers or chills.  No chest pain, shortness of breath, coughing.  No abdominal pain, nausea, vomiting, diarrhea.    REVIEW OF SYSTEMS: All other systems reviewed and are negative     PAST MEDICAL HISTORY:   Past Medical History:   Diagnosis Date   • Bipolar depression (CMS/HCC)    • Depression    • Diabetes mellitus (CMS/HCC)    • Hypertension    • Myocardial infarction (CMS/HCC)    • Schizophrenia (CMS/HCC)    • Seizures (CMS/HCC)         FAMILY HISTORY:   History reviewed. No pertinent family history.     SOCIAL HISTORY:   Social History     Socioeconomic History   • Marital status: Single     Spouse name: Not on file   • Number of children: Not on file   • Years of education: Not on file   • Highest education level: Not on file   Tobacco Use   • Smoking status: Current Every Day Smoker     Packs/day: 0.25     Types: Cigarettes   • Smokeless tobacco: Never Used   Substance and Sexual Activity   • Alcohol use: Yes     Comment: weekends   • Drug use: Not Currently     Comment: heroin- sniffs   • Sexual activity: Not Currently        SURGICAL HISTORY:   History reviewed. No pertinent surgical history.     CURRENT MEDICATIONS:      Medication List      ASK your doctor about these medications    acamprosate 333 MG EC tablet  Commonly known as: CAMPRAL  Take 2 tablets by mouth 3 (Three) Times a  Day.     divalproex 500 MG 24 hr tablet  Commonly known as: DEPAKOTE  Take 2 tablets by mouth Daily.     QUEtiapine 300 MG tablet  Commonly known as: SEROquel  Take 1 tablet by mouth Every Night.             ALLERGIES: Patient has no known allergies.     PHYSICAL EXAM:   VITAL SIGNS:   Vitals:    10/23/20 2007   BP: 130/82   Pulse: 105   Resp: 18   Temp: 97.5 °F (36.4 °C)   SpO2: 96%      CONSTITUTIONAL: Awake, oriented, appears non-toxic   HENT: Atraumatic, normocephalic, oral mucosa pink and moist, airway patent. Nares patent without drainage. External ears normal.   EYES: Conjunctiva clear  NECK: Trachea midline, non-tender, supple   CARDIOVASCULAR: Normal heart rate, Normal rhythm, No murmurs, rubs, gallops   PULMONARY/CHEST: Clear to auscultation, no rhonchi, wheezes, or rales. Symmetrical breath sounds.  ABDOMINAL: Non-distended, soft, non-tender - no rebound or guarding.  NEUROLOGIC: Non-focal, moving all four extremities, no gross sensory or motor deficits.  Patient does exhibit a slight tremor only intermittently.  No other obvious deficit appreciated on exam.  EXTREMITIES: No clubbing, cyanosis, or edema   SKIN: Warm, Dry, No erythema, No rash     ED COURSE / MEDICAL DECISION MAKING:   Kt Hernandez is a 55 y.o. male who presents to the emergency department for evaluation of tremors.  Patient is nondistressed on arrival.  Exam largely unremarkable.  Will obtain some labs and imaging for further evaluation.  Will attempt to obtain his medication list as he is unsure as to what medicines he takes.    EKG interpreted by me reveals sinus tachycardia with rate of 101 bpm.  There are few nonspecific findings but no obvious acute signs of ischemia.  This is an abnormal appearing EKG.    Laboratory testing does reveal a very low magnesium level at 0.9.  This was replaced intravenously.  Patient did have evidence of urinary tract infection and was started on antibiotics for this.  Patient's valproic acid  level was slightly low at 45.  He was given an extra dose of medication.    Per patient's sister whom we spoke with reveals that patient is an alcoholic and had been weaning himself slowly down and amounts of alcohol he drank however over the past 3 days has been drinking very heavily which is likely contributed to his presenting complaints.    Rechecked magnesium at an appropriate level.  Will discharge with return precautions.    DECISION TO DISCHARGE/ADMIT: see ED care timeline     FINAL IMPRESSION:   1 -- hypomagnesemia   2 -- tremor  3 -- UTI    Electronically signed by: Bushra Quintero MD, 10/23/2020 20:28 Bushra Craig MD  10/24/20 0116

## 2020-10-24 NOTE — ED NOTES
Urine requested, pt unable to void, collection supplies at bedside. Pt and family instructed to use call light after void.      Concha Enrique, RN  10/23/20 6807

## 2020-10-25 LAB — BACTERIA SPEC AEROBE CULT: NO GROWTH

## 2020-10-27 ENCOUNTER — HOSPITAL ENCOUNTER (EMERGENCY)
Facility: HOSPITAL | Age: 55
Discharge: HOME | End: 2020-10-27
Attending: EMERGENCY MEDICINE | Admitting: EMERGENCY MEDICINE

## 2020-10-27 VITALS
OXYGEN SATURATION: 96 % | BODY MASS INDEX: 25.9 KG/M2 | WEIGHT: 185 LBS | HEIGHT: 71 IN | RESPIRATION RATE: 18 BRPM | HEART RATE: 95 BPM | TEMPERATURE: 97 F | SYSTOLIC BLOOD PRESSURE: 97 MMHG | DIASTOLIC BLOOD PRESSURE: 73 MMHG

## 2020-10-27 DIAGNOSIS — F10.20 ALCOHOLISM (HCC): ICD-10-CM

## 2020-10-27 DIAGNOSIS — E83.42 HYPOMAGNESEMIA: ICD-10-CM
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
## 2020-10-27 DIAGNOSIS — G40.909 SEIZURE DISORDER (HCC): ICD-10-CM

## 2020-10-27 LAB
ALBUMIN SERPL-MCNC: 4 G/DL (ref 3.5–5.2)
ALBUMIN/GLOB SERPL: 1.9 G/DL
ALP SERPL-CCNC: 57 U/L (ref 39–117)
ALT SERPL W P-5'-P-CCNC: 35 U/L (ref 1–41)
ANION GAP SERPL CALCULATED.3IONS-SCNC: 22.3 MMOL/L (ref 5–15)
AST SERPL-CCNC: 45 U/L (ref 1–40)
BACTERIA UR QL AUTO: ABNORMAL /HPF
BASOPHILS # BLD AUTO: 0.02 10*3/MM3 (ref 0–0.2)
BASOPHILS NFR BLD AUTO: 0.3 % (ref 0–1.5)
BILIRUB SERPL-MCNC: 0.2 MG/DL (ref 0–1.2)
BILIRUB UR QL STRIP: NEGATIVE
BUN SERPL-MCNC: 7 MG/DL (ref 6–20)
BUN/CREAT SERPL: 6.9 (ref 7–25)
CALCIUM SPEC-SCNC: 9.6 MG/DL (ref 8.6–10.5)
CHLORIDE SERPL-SCNC: 100 MMOL/L (ref 98–107)
CLARITY UR: CLEAR
CO2 SERPL-SCNC: 18.7 MMOL/L (ref 22–29)
COLOR UR: YELLOW
CREAT SERPL-MCNC: 1.02 MG/DL (ref 0.76–1.27)
DEPRECATED RDW RBC AUTO: 54.5 FL (ref 37–54)
EOSINOPHIL # BLD AUTO: 0.03 10*3/MM3 (ref 0–0.4)
EOSINOPHIL NFR BLD AUTO: 0.5 % (ref 0.3–6.2)
ERYTHROCYTE [DISTWIDTH] IN BLOOD BY AUTOMATED COUNT: 16.9 % (ref 12.3–15.4)
GFR SERPL CREATININE-BSD FRML MDRD: 92 ML/MIN/1.73
GLOBULIN UR ELPH-MCNC: 2.1 GM/DL
GLUCOSE SERPL-MCNC: 87 MG/DL (ref 65–99)
GLUCOSE UR STRIP-MCNC: NEGATIVE MG/DL
HCT VFR BLD AUTO: 33.2 % (ref 37.5–51)
HGB BLD-MCNC: 11 G/DL (ref 13–17.7)
HGB UR QL STRIP.AUTO: NEGATIVE
HYALINE CASTS UR QL AUTO: ABNORMAL /LPF
IMM GRANULOCYTES # BLD AUTO: 0.04 10*3/MM3 (ref 0–0.05)
IMM GRANULOCYTES NFR BLD AUTO: 0.6 % (ref 0–0.5)
KETONES UR QL STRIP: ABNORMAL
LEUKOCYTE ESTERASE UR QL STRIP.AUTO: ABNORMAL
LYMPHOCYTES # BLD AUTO: 1.4 10*3/MM3 (ref 0.7–3.1)
LYMPHOCYTES NFR BLD AUTO: 21.2 % (ref 19.6–45.3)
MAGNESIUM SERPL-MCNC: 1.3 MG/DL (ref 1.6–2.6)
MCH RBC QN AUTO: 29.8 PG (ref 26.6–33)
MCHC RBC AUTO-ENTMCNC: 33.1 G/DL (ref 31.5–35.7)
MCV RBC AUTO: 90 FL (ref 79–97)
MONOCYTES # BLD AUTO: 0.84 10*3/MM3 (ref 0.1–0.9)
MONOCYTES NFR BLD AUTO: 12.7 % (ref 5–12)
NEUTROPHILS NFR BLD AUTO: 4.28 10*3/MM3 (ref 1.7–7)
NEUTROPHILS NFR BLD AUTO: 64.7 % (ref 42.7–76)
NITRITE UR QL STRIP: NEGATIVE
NRBC BLD AUTO-RTO: 1.1 /100 WBC (ref 0–0.2)
PH UR STRIP.AUTO: 5.5 [PH] (ref 5–8)
PLATELET # BLD AUTO: 186 10*3/MM3 (ref 140–450)
PMV BLD AUTO: 10.3 FL (ref 6–12)
POTASSIUM SERPL-SCNC: 3.8 MMOL/L (ref 3.5–5.2)
PROT SERPL-MCNC: 6.1 G/DL (ref 6–8.5)
PROT UR QL STRIP: ABNORMAL
RBC # BLD AUTO: 3.69 10*6/MM3 (ref 4.14–5.8)
RBC # UR: ABNORMAL /HPF
REF LAB TEST METHOD: ABNORMAL
SODIUM SERPL-SCNC: 141 MMOL/L (ref 136–145)
SP GR UR STRIP: 1.02 (ref 1–1.03)
SQUAMOUS #/AREA URNS HPF: ABNORMAL /HPF
UROBILINOGEN UR QL STRIP: ABNORMAL
VALPROATE SERPL-MCNC: 52.8 MCG/ML (ref 50–125)
WBC # BLD AUTO: 6.61 10*3/MM3 (ref 3.4–10.8)
WBC UR QL AUTO: ABNORMAL /HPF

## 2020-10-27 PROCEDURE — 83735 ASSAY OF MAGNESIUM: CPT | Performed by: EMERGENCY MEDICINE

## 2020-10-27 PROCEDURE — 80053 COMPREHEN METABOLIC PANEL: CPT | Performed by: EMERGENCY MEDICINE

## 2020-10-27 PROCEDURE — 85025 COMPLETE CBC W/AUTO DIFF WBC: CPT | Performed by: EMERGENCY MEDICINE

## 2020-10-27 PROCEDURE — 96375 TX/PRO/DX INJ NEW DRUG ADDON: CPT

## 2020-10-27 PROCEDURE — 80164 ASSAY DIPROPYLACETIC ACD TOT: CPT | Performed by: EMERGENCY MEDICINE

## 2020-10-27 PROCEDURE — 96365 THER/PROPH/DIAG IV INF INIT: CPT

## 2020-10-27 PROCEDURE — 96366 THER/PROPH/DIAG IV INF ADDON: CPT

## 2020-10-27 PROCEDURE — 99284 EMERGENCY DEPT VISIT MOD MDM: CPT

## 2020-10-27 PROCEDURE — 93005 ELECTROCARDIOGRAM TRACING: CPT | Performed by: EMERGENCY MEDICINE

## 2020-10-27 PROCEDURE — 25010000002 LORAZEPAM PER 2 MG: Performed by: EMERGENCY MEDICINE

## 2020-10-27 PROCEDURE — 81001 URINALYSIS AUTO W/SCOPE: CPT | Performed by: EMERGENCY MEDICINE

## 2020-10-27 PROCEDURE — 25010000002 MAGNESIUM SULFATE 2 GM/50ML SOLUTION: Performed by: EMERGENCY MEDICINE

## 2020-10-27 RX ORDER — LORAZEPAM 2 MG/ML
1 INJECTION INTRAMUSCULAR ONCE
Status: COMPLETED | OUTPATIENT
Start: 2020-10-27 | End: 2020-10-27

## 2020-10-27 RX ORDER — MAGNESIUM OXIDE 400 MG/1
400 TABLET ORAL 2 TIMES DAILY
Qty: 30 TABLET | Refills: 0 | Status: ON HOLD | OUTPATIENT
Start: 2020-10-27 | End: 2021-03-06

## 2020-10-27 RX ORDER — MAGNESIUM SULFATE HEPTAHYDRATE 40 MG/ML
4 INJECTION, SOLUTION INTRAVENOUS ONCE
Status: COMPLETED | OUTPATIENT
Start: 2020-10-27 | End: 2020-10-27

## 2020-10-27 RX ORDER — SODIUM CHLORIDE 0.9 % (FLUSH) 0.9 %
10 SYRINGE (ML) INJECTION AS NEEDED
Status: DISCONTINUED | OUTPATIENT
Start: 2020-10-27 | End: 2020-10-27 | Stop reason: HOSPADM

## 2020-10-27 RX ADMIN — LORAZEPAM 1 MG: 2 INJECTION, SOLUTION INTRAMUSCULAR; INTRAVENOUS at 13:01

## 2020-10-27 RX ADMIN — MAGNESIUM SULFATE IN WATER 4 G: 40 INJECTION, SOLUTION INTRAVENOUS at 12:25

## 2021-01-06 ENCOUNTER — LAB REQUISITION (OUTPATIENT)
Dept: LAB | Facility: HOSPITAL | Age: 56
End: 2021-01-06

## 2021-01-06 DIAGNOSIS — E11.9 TYPE 2 DIABETES MELLITUS WITHOUT COMPLICATIONS (HCC): ICD-10-CM

## 2021-01-06 DIAGNOSIS — I10 ESSENTIAL (PRIMARY) HYPERTENSION: ICD-10-CM

## 2021-01-06 DIAGNOSIS — K21.9 GASTRO-ESOPHAGEAL REFLUX DISEASE WITHOUT ESOPHAGITIS: ICD-10-CM

## 2021-01-06 DIAGNOSIS — E78.5 HYPERLIPIDEMIA, UNSPECIFIED: ICD-10-CM

## 2021-01-06 DIAGNOSIS — N39.0 URINARY TRACT INFECTION, SITE NOT SPECIFIED: ICD-10-CM

## 2021-01-06 DIAGNOSIS — G40.89 OTHER SEIZURES (HCC): ICD-10-CM

## 2021-01-06 PROCEDURE — U0004 COV-19 TEST NON-CDC HGH THRU: HCPCS | Performed by: NURSE PRACTITIONER

## 2021-01-07 LAB — SARS-COV-2 RNA RESP QL NAA+PROBE: DETECTED

## 2021-03-05 ENCOUNTER — HOSPITAL ENCOUNTER (EMERGENCY)
Facility: HOSPITAL | Age: 56
Discharge: PSYCHIATRIC OR SUBSTANCE USE FACILITY | End: 2021-03-06
Attending: EMERGENCY MEDICINE | Admitting: EMERGENCY MEDICINE

## 2021-03-05 DIAGNOSIS — F10.10 ALCOHOL ABUSE: Primary | ICD-10-CM

## 2021-03-05 DIAGNOSIS — F10.930 ALCOHOL WITHDRAWAL SYNDROME WITHOUT COMPLICATION (HCC): ICD-10-CM

## 2021-03-05 LAB
ALBUMIN SERPL-MCNC: 4.6 G/DL (ref 3.5–5.2)
ALBUMIN/GLOB SERPL: 1.6 G/DL
ALP SERPL-CCNC: 98 U/L (ref 39–117)
ALT SERPL W P-5'-P-CCNC: 7 U/L (ref 1–41)
AMPHET+METHAMPHET UR QL: NEGATIVE
AMPHETAMINES UR QL: NEGATIVE
ANION GAP SERPL CALCULATED.3IONS-SCNC: 29.5 MMOL/L (ref 5–15)
APAP SERPL-MCNC: <5 MCG/ML (ref 0–30)
AST SERPL-CCNC: 19 U/L (ref 1–40)
BARBITURATES UR QL SCN: NEGATIVE
BASOPHILS # BLD AUTO: 0.02 10*3/MM3 (ref 0–0.2)
BASOPHILS NFR BLD AUTO: 0.2 % (ref 0–1.5)
BENZODIAZ UR QL SCN: NEGATIVE
BILIRUB SERPL-MCNC: <0.2 MG/DL (ref 0–1.2)
BILIRUB UR QL STRIP: NEGATIVE
BUN SERPL-MCNC: 12 MG/DL (ref 6–20)
BUN/CREAT SERPL: 11.2 (ref 7–25)
BUPRENORPHINE SERPL-MCNC: NEGATIVE NG/ML
CALCIUM SPEC-SCNC: 9.4 MG/DL (ref 8.6–10.5)
CANNABINOIDS SERPL QL: NEGATIVE
CHLORIDE SERPL-SCNC: 97 MMOL/L (ref 98–107)
CLARITY UR: CLEAR
CO2 SERPL-SCNC: 15.5 MMOL/L (ref 22–29)
COCAINE UR QL: NEGATIVE
COLOR UR: YELLOW
CREAT SERPL-MCNC: 1.07 MG/DL (ref 0.76–1.27)
DEPRECATED RDW RBC AUTO: 48.1 FL (ref 37–54)
EOSINOPHIL # BLD AUTO: 0.04 10*3/MM3 (ref 0–0.4)
EOSINOPHIL NFR BLD AUTO: 0.4 % (ref 0.3–6.2)
ERYTHROCYTE [DISTWIDTH] IN BLOOD BY AUTOMATED COUNT: 14.4 % (ref 12.3–15.4)
ETHANOL BLD-MCNC: 175 MG/DL (ref 0–10)
ETHANOL BLD-MCNC: 312 MG/DL (ref 0–10)
ETHANOL UR QL: 0.17 %
ETHANOL UR QL: 0.31 %
GFR SERPL CREATININE-BSD FRML MDRD: 87 ML/MIN/1.73
GLOBULIN UR ELPH-MCNC: 2.9 GM/DL
GLUCOSE BLDC GLUCOMTR-MCNC: 85 MG/DL (ref 70–130)
GLUCOSE SERPL-MCNC: 43 MG/DL (ref 65–99)
GLUCOSE UR STRIP-MCNC: NEGATIVE MG/DL
HCT VFR BLD AUTO: 41.2 % (ref 37.5–51)
HGB BLD-MCNC: 13.5 G/DL (ref 13–17.7)
HGB UR QL STRIP.AUTO: NEGATIVE
IMM GRANULOCYTES # BLD AUTO: 0.02 10*3/MM3 (ref 0–0.05)
IMM GRANULOCYTES NFR BLD AUTO: 0.2 % (ref 0–0.5)
KETONES UR QL STRIP: ABNORMAL
LEUKOCYTE ESTERASE UR QL STRIP.AUTO: NEGATIVE
LYMPHOCYTES # BLD AUTO: 4.78 10*3/MM3 (ref 0.7–3.1)
LYMPHOCYTES NFR BLD AUTO: 47.6 % (ref 19.6–45.3)
MAGNESIUM SERPL-MCNC: 1.9 MG/DL (ref 1.6–2.6)
MCH RBC QN AUTO: 29.9 PG (ref 26.6–33)
MCHC RBC AUTO-ENTMCNC: 32.8 G/DL (ref 31.5–35.7)
MCV RBC AUTO: 91.4 FL (ref 79–97)
METHADONE UR QL SCN: NEGATIVE
MONOCYTES # BLD AUTO: 0.46 10*3/MM3 (ref 0.1–0.9)
MONOCYTES NFR BLD AUTO: 4.6 % (ref 5–12)
NEUTROPHILS NFR BLD AUTO: 4.72 10*3/MM3 (ref 1.7–7)
NEUTROPHILS NFR BLD AUTO: 47 % (ref 42.7–76)
NITRITE UR QL STRIP: NEGATIVE
NRBC BLD AUTO-RTO: 0.2 /100 WBC (ref 0–0.2)
OPIATES UR QL: NEGATIVE
OXYCODONE UR QL SCN: NEGATIVE
PCP UR QL SCN: NEGATIVE
PH UR STRIP.AUTO: 6 [PH] (ref 5–8)
PLATELET # BLD AUTO: 147 10*3/MM3 (ref 140–450)
PMV BLD AUTO: 11 FL (ref 6–12)
POTASSIUM SERPL-SCNC: 4 MMOL/L (ref 3.5–5.2)
PROPOXYPH UR QL: NEGATIVE
PROT SERPL-MCNC: 7.5 G/DL (ref 6–8.5)
PROT UR QL STRIP: NEGATIVE
RBC # BLD AUTO: 4.51 10*6/MM3 (ref 4.14–5.8)
SALICYLATES SERPL-MCNC: <0.3 MG/DL
SARS-COV-2 RNA PNL SPEC NAA+PROBE: NOT DETECTED
SODIUM SERPL-SCNC: 142 MMOL/L (ref 136–145)
SP GR UR STRIP: <=1.005 (ref 1–1.03)
TRICYCLICS UR QL SCN: NEGATIVE
UROBILINOGEN UR QL STRIP: ABNORMAL
WBC # BLD AUTO: 10.04 10*3/MM3 (ref 3.4–10.8)

## 2021-03-05 PROCEDURE — 93005 ELECTROCARDIOGRAM TRACING: CPT | Performed by: PHYSICIAN ASSISTANT

## 2021-03-05 PROCEDURE — 87635 SARS-COV-2 COVID-19 AMP PRB: CPT | Performed by: PHYSICIAN ASSISTANT

## 2021-03-05 PROCEDURE — 82962 GLUCOSE BLOOD TEST: CPT

## 2021-03-05 PROCEDURE — 81003 URINALYSIS AUTO W/O SCOPE: CPT | Performed by: PHYSICIAN ASSISTANT

## 2021-03-05 PROCEDURE — 82077 ASSAY SPEC XCP UR&BREATH IA: CPT | Performed by: PHYSICIAN ASSISTANT

## 2021-03-05 PROCEDURE — 80179 DRUG ASSAY SALICYLATE: CPT | Performed by: PHYSICIAN ASSISTANT

## 2021-03-05 PROCEDURE — 99285 EMERGENCY DEPT VISIT HI MDM: CPT

## 2021-03-05 PROCEDURE — 96361 HYDRATE IV INFUSION ADD-ON: CPT

## 2021-03-05 PROCEDURE — 80143 DRUG ASSAY ACETAMINOPHEN: CPT | Performed by: PHYSICIAN ASSISTANT

## 2021-03-05 PROCEDURE — 80306 DRUG TEST PRSMV INSTRMNT: CPT | Performed by: PHYSICIAN ASSISTANT

## 2021-03-05 PROCEDURE — 96374 THER/PROPH/DIAG INJ IV PUSH: CPT

## 2021-03-05 PROCEDURE — 85025 COMPLETE CBC W/AUTO DIFF WBC: CPT | Performed by: PHYSICIAN ASSISTANT

## 2021-03-05 PROCEDURE — 83735 ASSAY OF MAGNESIUM: CPT | Performed by: PHYSICIAN ASSISTANT

## 2021-03-05 PROCEDURE — 80053 COMPREHEN METABOLIC PANEL: CPT | Performed by: PHYSICIAN ASSISTANT

## 2021-03-05 RX ORDER — DEXTROSE MONOHYDRATE 25 G/50ML
50 INJECTION, SOLUTION INTRAVENOUS
Status: DISCONTINUED | OUTPATIENT
Start: 2021-03-05 | End: 2021-03-06 | Stop reason: HOSPADM

## 2021-03-05 RX ADMIN — SODIUM CHLORIDE 1000 ML: 9 INJECTION, SOLUTION INTRAVENOUS at 17:43

## 2021-03-05 RX ADMIN — DEXTROSE MONOHYDRATE 50 ML: 25 INJECTION, SOLUTION INTRAVENOUS at 18:24

## 2021-03-05 NOTE — ED PROVIDER NOTES
Subjective   55-year-old male presents here with his sister requesting alcohol inpatient detox, he has had at least a pint of alcohol to drink today and last drank about 30 minutes prior to arrival, he is long history of alcohol abuse.  He is requesting inpatient detox, his sister has talked to the [place] and they recommended he come to the emergency department.  He is noticeably intoxicated, and his speech is slurred.      History provided by:  Relative  History limited by: Intoxication.      Review of Systems   Psychiatric/Behavioral:        Alcohol intoxication   All other systems reviewed and are negative.      Past Medical History:   Diagnosis Date   • Alcoholism (CMS/HCC)    • Bipolar depression (CMS/HCC)    • Depression    • Diabetes mellitus (CMS/HCC)    • Hypertension    • Myocardial infarction (CMS/HCC)    • Schizophrenia (CMS/HCC)    • Seizures (CMS/HCC)        No Known Allergies    History reviewed. No pertinent surgical history.    History reviewed. No pertinent family history.    Social History     Socioeconomic History   • Marital status: Single     Spouse name: Not on file   • Number of children: Not on file   • Years of education: Not on file   • Highest education level: Not on file   Tobacco Use   • Smoking status: Current Every Day Smoker     Packs/day: 0.25     Types: Cigarettes   • Smokeless tobacco: Never Used   Substance and Sexual Activity   • Alcohol use: Yes     Comment: 1/2 gallon vodka every other day   • Drug use: Not Currently     Comment: heroin- sniffs   • Sexual activity: Not Currently           Objective   Physical Exam  Vitals signs and nursing note reviewed.   Constitutional:       Appearance: He is well-developed.   HENT:      Head: Normocephalic and atraumatic.   Neck:      Musculoskeletal: Normal range of motion.   Cardiovascular:      Rate and Rhythm: Normal rate and regular rhythm.   Pulmonary:      Effort: Pulmonary effort is normal.      Breath sounds: Normal breath sounds.    Abdominal:      General: Bowel sounds are normal.      Palpations: Abdomen is soft.   Musculoskeletal: Normal range of motion.   Skin:     General: Skin is warm and dry.   Neurological:      Mental Status: He is alert and oriented to person, place, and time.   Psychiatric:      Comments: Slurred speech         Procedures           ED Course  ED Course as of Mar 06 0354   Fri Mar 05, 2021   1823 EKG interpreted by me.  Sinus rhythm.  Tachycardic.  Rate of 101.  No obvious ST segment or T wave abnormalities.  Abnormal EKG    [CG]   Sat Mar 06, 2021   0353 Ethanol(!): 175 [PF]   0353 THC Screen, Urine: Negative [PF]   0353 Phencyclidine (PCP), Urine: Negative [PF]   0353 Cocaine Screen, Urine: Negative [PF]   0353 Methamphetamine, Ur: Negative [PF]   0353 Opiate Screen: Negative [PF]   0353 Amphetamine, Urine Qual: Negative [PF]   0353 Benzodiazepine Screen, Urine: Negative [PF]   0353 Tricyclic Antidepressants Screen: Negative [PF]   0353 Methadone Screen , Urine: Negative [PF]   0353 Barbiturates Screen, Urine: Negative [PF]   0353 Oxycodone Screen, Urine: Negative [PF]   0353 Propoxyphene Screen: Negative [PF]   0353 Buprenorphine, Screen, Urine: Negative [PF]   0353 Glucose: 85 [PF]   0353 COVID19: Not Detected [PF]   0354 Color, UA: Yellow [PF]   0354 Appearance, UA: Clear [PF]   0354 pH, UA: 6.0 [PF]   0354 Specific Gravity, UA: <=1.005 [PF]   0354 Glucose: Negative [PF]   0354 Bilirubin, UA: Negative [PF]   0354 Ketones, UA(!): 15 mg/dL (1+) [PF]   0354 Blood, UA: Negative [PF]   0354 Protein, UA: Negative [PF]   0354 Leukocytes, UA: Negative [PF]   0354 Nitrite, UA: Negative [PF]   0354 Salicylate: <0.3 [PF]   0354 Acetaminophen: <5.0 [PF]   0354 Magnesium: 1.9 [PF]   0354 Glucose(!!): 43 [PF]   0354 BUN: 12 [PF]   0354 Creatinine: 1.07 [PF]   0354 Sodium: 142 [PF]   0354 Potassium: 4.0 [PF]   0354 Chloride(!): 97 [PF]   0354 CO2(!): 15.5 [PF]   0354 Total Protein: 7.5 [PF]   0354 Albumin: 4.60 [PF]   0354  ALT (SGPT): 7 [PF]   0354 AST (SGOT): 19 [PF]   0354 Alkaline Phosphatase: 98 [PF]   0354 Total Bilirubin: <0.2 [PF]   0354 WBC: 10.04 [PF]   0354 Hemoglobin: 13.5 [PF]   0354 Hematocrit: 41.2 [PF]   0354 Platelets: 147 [PF]      ED Course User Index  [CG] Say Rosas,   [PF] Elias Ji, DO                                           MDM I received care from physicians assistant  Patient medically clear for behavioral health evaluation.  Accepted to [facility] in the care of Dr. Boogie.  Patient developed some tremulousness, added Ativan for early withdrawals.  Final diagnoses:   Alcohol abuse   Alcohol withdrawal syndrome without complication (CMS/HCC)            Elias Ji DO  03/06/21 0354

## 2021-03-06 ENCOUNTER — HOSPITAL ENCOUNTER (INPATIENT)
Facility: HOSPITAL | Age: 56
LOS: 5 days | Discharge: HOME | End: 2021-03-11
Attending: PSYCHIATRY & NEUROLOGY | Admitting: PSYCHIATRY & NEUROLOGY

## 2021-03-06 VITALS
BODY MASS INDEX: 22.4 KG/M2 | HEIGHT: 71 IN | DIASTOLIC BLOOD PRESSURE: 88 MMHG | WEIGHT: 160 LBS | TEMPERATURE: 98.9 F | HEART RATE: 101 BPM | SYSTOLIC BLOOD PRESSURE: 120 MMHG | RESPIRATION RATE: 16 BRPM | OXYGEN SATURATION: 98 %

## 2021-03-06 PROBLEM — F10.10 ALCOHOL ABUSE: Status: ACTIVE | Noted: 2021-03-06

## 2021-03-06 LAB
ETHANOL BLD-MCNC: 52 MG/DL (ref 0–10)
ETHANOL UR QL: 0.05 %
FLUAV RNA RESP QL NAA+PROBE: NOT DETECTED
FLUBV RNA RESP QL NAA+PROBE: NOT DETECTED
GLUCOSE BLDC GLUCOMTR-MCNC: 146 MG/DL (ref 70–130)
GLUCOSE BLDC GLUCOMTR-MCNC: 175 MG/DL (ref 70–130)
GLUCOSE BLDC GLUCOMTR-MCNC: 176 MG/DL (ref 70–130)
GLUCOSE BLDC GLUCOMTR-MCNC: 91 MG/DL (ref 70–130)
SARS-COV-2 RNA RESP QL NAA+PROBE: DETECTED
SARS-COV-2 RNA RESP QL NAA+PROBE: NOT DETECTED

## 2021-03-06 PROCEDURE — 82077 ASSAY SPEC XCP UR&BREATH IA: CPT | Performed by: EMERGENCY MEDICINE

## 2021-03-06 PROCEDURE — 82962 GLUCOSE BLOOD TEST: CPT

## 2021-03-06 PROCEDURE — 87636 SARSCOV2 & INF A&B AMP PRB: CPT | Performed by: PSYCHIATRY & NEUROLOGY

## 2021-03-06 PROCEDURE — U0005 INFEC AGEN DETEC AMPLI PROBE: HCPCS | Performed by: PSYCHIATRY & NEUROLOGY

## 2021-03-06 PROCEDURE — HZ2ZZZZ DETOXIFICATION SERVICES FOR SUBSTANCE ABUSE TREATMENT: ICD-10-PCS | Performed by: PSYCHIATRY & NEUROLOGY

## 2021-03-06 PROCEDURE — 25010000002 LORAZEPAM PER 2 MG

## 2021-03-06 PROCEDURE — U0003 INFECTIOUS AGENT DETECTION BY NUCLEIC ACID (DNA OR RNA); SEVERE ACUTE RESPIRATORY SYNDROME CORONAVIRUS 2 (SARS-COV-2) (CORONAVIRUS DISEASE [COVID-19]), AMPLIFIED PROBE TECHNIQUE, MAKING USE OF HIGH THROUGHPUT TECHNOLOGIES AS DESCRIBED BY CMS-2020-01-R: HCPCS | Performed by: PSYCHIATRY & NEUROLOGY

## 2021-03-06 PROCEDURE — 96375 TX/PRO/DX INJ NEW DRUG ADDON: CPT

## 2021-03-06 RX ORDER — TAMSULOSIN HYDROCHLORIDE 0.4 MG/1
1 CAPSULE ORAL DAILY
Status: ON HOLD | COMMUNITY
End: 2021-03-11 | Stop reason: SDUPTHER

## 2021-03-06 RX ORDER — PANTOPRAZOLE SODIUM 40 MG/1
40 TABLET, DELAYED RELEASE ORAL DAILY
Status: ON HOLD | COMMUNITY
End: 2021-03-11 | Stop reason: SDUPTHER

## 2021-03-06 RX ORDER — BENZONATATE 100 MG/1
100 CAPSULE ORAL 3 TIMES DAILY PRN
Status: DISCONTINUED | OUTPATIENT
Start: 2021-03-06 | End: 2021-03-11 | Stop reason: HOSPADM

## 2021-03-06 RX ORDER — BUSPIRONE HYDROCHLORIDE 5 MG/1
5 TABLET ORAL DAILY
Status: ON HOLD | COMMUNITY
End: 2021-03-11 | Stop reason: SDUPTHER

## 2021-03-06 RX ORDER — ACETAMINOPHEN 325 MG/1
650 TABLET ORAL EVERY 6 HOURS PRN
Status: DISCONTINUED | OUTPATIENT
Start: 2021-03-06 | End: 2021-03-11 | Stop reason: HOSPADM

## 2021-03-06 RX ORDER — BENZTROPINE MESYLATE 1 MG/ML
1 INJECTION INTRAMUSCULAR; INTRAVENOUS ONCE AS NEEDED
Status: DISCONTINUED | OUTPATIENT
Start: 2021-03-06 | End: 2021-03-11 | Stop reason: HOSPADM

## 2021-03-06 RX ORDER — DIVALPROEX SODIUM 500 MG/1
500 TABLET, EXTENDED RELEASE ORAL 3 TIMES DAILY
Status: ON HOLD | COMMUNITY
End: 2021-03-11 | Stop reason: SDUPTHER

## 2021-03-06 RX ORDER — DIVALPROEX SODIUM 500 MG/1
500 TABLET, EXTENDED RELEASE ORAL 3 TIMES DAILY
Status: DISCONTINUED | OUTPATIENT
Start: 2021-03-06 | End: 2021-03-11 | Stop reason: HOSPADM

## 2021-03-06 RX ORDER — PANTOPRAZOLE SODIUM 40 MG/1
40 TABLET, DELAYED RELEASE ORAL DAILY
Status: DISCONTINUED | OUTPATIENT
Start: 2021-03-06 | End: 2021-03-11 | Stop reason: HOSPADM

## 2021-03-06 RX ORDER — ALUMINA, MAGNESIA, AND SIMETHICONE 2400; 2400; 240 MG/30ML; MG/30ML; MG/30ML
15 SUSPENSION ORAL EVERY 6 HOURS PRN
Status: DISCONTINUED | OUTPATIENT
Start: 2021-03-06 | End: 2021-03-11 | Stop reason: HOSPADM

## 2021-03-06 RX ORDER — LORAZEPAM 2 MG/ML
1 INJECTION INTRAMUSCULAR ONCE
Status: COMPLETED | OUTPATIENT
Start: 2021-03-06 | End: 2021-03-06

## 2021-03-06 RX ORDER — LEVETIRACETAM 500 MG/1
500 TABLET ORAL EVERY 12 HOURS SCHEDULED
Status: DISCONTINUED | OUTPATIENT
Start: 2021-03-06 | End: 2021-03-11 | Stop reason: HOSPADM

## 2021-03-06 RX ORDER — LORAZEPAM 0.5 MG/1
0.5 TABLET ORAL EVERY 4 HOURS PRN
Status: ACTIVE | OUTPATIENT
Start: 2021-03-10 | End: 2021-03-11

## 2021-03-06 RX ORDER — ECHINACEA PURPUREA EXTRACT 125 MG
2 TABLET ORAL AS NEEDED
Status: DISCONTINUED | OUTPATIENT
Start: 2021-03-06 | End: 2021-03-11 | Stop reason: HOSPADM

## 2021-03-06 RX ORDER — BUSPIRONE HYDROCHLORIDE 10 MG/1
5 TABLET ORAL DAILY
Status: DISCONTINUED | OUTPATIENT
Start: 2021-03-06 | End: 2021-03-11 | Stop reason: HOSPADM

## 2021-03-06 RX ORDER — LORAZEPAM 2 MG/ML
INJECTION INTRAMUSCULAR
Status: COMPLETED
Start: 2021-03-06 | End: 2021-03-06

## 2021-03-06 RX ORDER — TAMSULOSIN HYDROCHLORIDE 0.4 MG/1
0.4 CAPSULE ORAL DAILY
Status: DISCONTINUED | OUTPATIENT
Start: 2021-03-06 | End: 2021-03-11 | Stop reason: HOSPADM

## 2021-03-06 RX ORDER — LORAZEPAM 2 MG/1
2 TABLET ORAL
Status: DISPENSED | OUTPATIENT
Start: 2021-03-06 | End: 2021-03-07

## 2021-03-06 RX ORDER — ATORVASTATIN CALCIUM 20 MG/1
20 TABLET, FILM COATED ORAL DAILY
Status: DISCONTINUED | OUTPATIENT
Start: 2021-03-06 | End: 2021-03-11 | Stop reason: HOSPADM

## 2021-03-06 RX ORDER — NICOTINE 21 MG/24HR
1 PATCH, TRANSDERMAL 24 HOURS TRANSDERMAL
Status: DISCONTINUED | OUTPATIENT
Start: 2021-03-06 | End: 2021-03-11 | Stop reason: HOSPADM

## 2021-03-06 RX ORDER — LORAZEPAM 1 MG/1
1 TABLET ORAL EVERY 4 HOURS PRN
Status: ACTIVE | OUTPATIENT
Start: 2021-03-09 | End: 2021-03-10

## 2021-03-06 RX ORDER — LORAZEPAM 1 MG/1
1 TABLET ORAL
Status: COMPLETED | OUTPATIENT
Start: 2021-03-09 | End: 2021-03-09

## 2021-03-06 RX ORDER — FAMOTIDINE 20 MG/1
20 TABLET, FILM COATED ORAL 2 TIMES DAILY PRN
Status: DISCONTINUED | OUTPATIENT
Start: 2021-03-06 | End: 2021-03-11 | Stop reason: HOSPADM

## 2021-03-06 RX ORDER — BENZTROPINE MESYLATE 1 MG/1
2 TABLET ORAL ONCE AS NEEDED
Status: DISCONTINUED | OUTPATIENT
Start: 2021-03-06 | End: 2021-03-11 | Stop reason: HOSPADM

## 2021-03-06 RX ORDER — HYDROXYZINE 50 MG/1
50 TABLET, FILM COATED ORAL EVERY 6 HOURS PRN
Status: DISCONTINUED | OUTPATIENT
Start: 2021-03-06 | End: 2021-03-11 | Stop reason: HOSPADM

## 2021-03-06 RX ORDER — DIPHENOXYLATE HYDROCHLORIDE AND ATROPINE SULFATE 2.5; .025 MG/1; MG/1
1 TABLET ORAL DAILY
Status: DISCONTINUED | OUTPATIENT
Start: 2021-03-06 | End: 2021-03-11 | Stop reason: HOSPADM

## 2021-03-06 RX ORDER — ONDANSETRON 4 MG/1
4 TABLET, FILM COATED ORAL EVERY 6 HOURS PRN
Status: DISCONTINUED | OUTPATIENT
Start: 2021-03-06 | End: 2021-03-11 | Stop reason: HOSPADM

## 2021-03-06 RX ORDER — LOPERAMIDE HYDROCHLORIDE 2 MG/1
2 CAPSULE ORAL
Status: DISCONTINUED | OUTPATIENT
Start: 2021-03-06 | End: 2021-03-11 | Stop reason: HOSPADM

## 2021-03-06 RX ORDER — LORAZEPAM 0.5 MG/1
0.5 TABLET ORAL
Status: COMPLETED | OUTPATIENT
Start: 2021-03-10 | End: 2021-03-10

## 2021-03-06 RX ORDER — LORAZEPAM 2 MG/1
2 TABLET ORAL
Status: COMPLETED | OUTPATIENT
Start: 2021-03-07 | End: 2021-03-07

## 2021-03-06 RX ORDER — IBUPROFEN 400 MG/1
400 TABLET ORAL EVERY 6 HOURS PRN
Status: DISCONTINUED | OUTPATIENT
Start: 2021-03-06 | End: 2021-03-11 | Stop reason: HOSPADM

## 2021-03-06 RX ORDER — TRAZODONE HYDROCHLORIDE 50 MG/1
50 TABLET ORAL NIGHTLY PRN
Status: DISCONTINUED | OUTPATIENT
Start: 2021-03-06 | End: 2021-03-11 | Stop reason: HOSPADM

## 2021-03-06 RX ORDER — LORAZEPAM 2 MG/1
2 TABLET ORAL EVERY 4 HOURS PRN
Status: ACTIVE | OUTPATIENT
Start: 2021-03-07 | End: 2021-03-08

## 2021-03-06 RX ORDER — MULTIVITAMIN WITH IRON
2 TABLET ORAL DAILY
Status: DISCONTINUED | OUTPATIENT
Start: 2021-03-06 | End: 2021-03-11 | Stop reason: HOSPADM

## 2021-03-06 RX ADMIN — LORAZEPAM 2 MG: 2 TABLET ORAL at 15:20

## 2021-03-06 RX ADMIN — LORAZEPAM 2 MG: 2 TABLET ORAL at 21:42

## 2021-03-06 RX ADMIN — PANTOPRAZOLE SODIUM 40 MG: 40 TABLET, DELAYED RELEASE ORAL at 16:45

## 2021-03-06 RX ADMIN — LEVETIRACETAM 500 MG: 500 TABLET, FILM COATED ORAL at 18:29

## 2021-03-06 RX ADMIN — LORAZEPAM 1 MG: 2 INJECTION INTRAMUSCULAR; INTRAVENOUS at 04:21

## 2021-03-06 RX ADMIN — Medication 2 TABLET: at 12:33

## 2021-03-06 RX ADMIN — ATORVASTATIN CALCIUM 20 MG: 20 TABLET, FILM COATED ORAL at 16:45

## 2021-03-06 RX ADMIN — LORAZEPAM 2 MG: 2 TABLET ORAL at 12:20

## 2021-03-06 RX ADMIN — METOPROLOL TARTRATE 25 MG: 25 TABLET, FILM COATED ORAL at 15:14

## 2021-03-06 RX ADMIN — Medication 1 TABLET: at 12:34

## 2021-03-06 RX ADMIN — BUSPIRONE HYDROCHLORIDE 5 MG: 10 TABLET ORAL at 15:14

## 2021-03-06 RX ADMIN — Medication 100 MG: at 12:33

## 2021-03-06 RX ADMIN — DIVALPROEX SODIUM 500 MG: 500 TABLET, FILM COATED, EXTENDED RELEASE ORAL at 16:45

## 2021-03-06 RX ADMIN — METFORMIN HYDROCHLORIDE 1000 MG: 500 TABLET ORAL at 18:29

## 2021-03-06 RX ADMIN — LORAZEPAM 2 MG: 2 TABLET ORAL at 09:54

## 2021-03-06 RX ADMIN — LORAZEPAM 1 MG: 2 INJECTION INTRAMUSCULAR at 04:21

## 2021-03-06 RX ADMIN — TRAZODONE HYDROCHLORIDE 50 MG: 50 TABLET ORAL at 21:42

## 2021-03-06 RX ADMIN — LINAGLIPTIN 5 MG: 5 TABLET, FILM COATED ORAL at 16:45

## 2021-03-06 RX ADMIN — DIVALPROEX SODIUM 500 MG: 500 TABLET, FILM COATED, EXTENDED RELEASE ORAL at 21:42

## 2021-03-06 RX ADMIN — TAMSULOSIN HYDROCHLORIDE 0.4 MG: 0.4 CAPSULE ORAL at 16:45

## 2021-03-06 NOTE — PLAN OF CARE
Problem: Adult Behavioral Health Plan of Care  Goal: Plan of Care Review  Outcome: Ongoing, Progressing  Flowsheets (Taken 3/6/2021 1511)  Consent Given to Review Plan with: no consent today  Progress: no change  Plan of Care Reviewed With: patient  Patient Agreement with Plan of Care: agrees  Outcome Summary: Briefly reviewed plan of care and completed adult social history.     Problem: Adult Behavioral Health Plan of Care  Goal: Patient-Specific Goal (Individualization)  Outcome: Ongoing, Progressing  Flowsheets  Taken 3/6/2021 1511  Patient Personal Strengths: resourceful  Patient-Specific Goals (Include Timeframe): Patient to completed medical detox during his 3-5 day hospital stay.  patient to identify 1-2 healthy coping skills to assist in relapse prevention.  patient to engage in safe disposition planning.  Individualized Care Needs: medication management, individual and group therapy  Anxieties, Fears or Concerns: none reported  Patient Vulnerabilities: substance abuse/addiction  Taken 3/6/2021 1504  Patient Personal Strengths: resourceful  Patient Vulnerabilities: substance abuse/addiction     Problem: Adult Behavioral Health Plan of Care  Goal: Optimized Coping Skills in Response to Life Stressors  Intervention: Promote Effective Coping Strategies  Flowsheets (Taken 3/6/2021 1511)  Supportive Measures:   active listening utilized   positive reinforcement provided   counseling provided   problem-solving facilitated   decision-making supported   goal setting facilitated   self-reflection promoted   self-care encouraged   verbalization of feelings encouraged   self-responsibility promoted     Problem: Adult Behavioral Health Plan of Care  Goal: Develops/Participates in Therapeutic [place] to Support Successful Transition  Intervention: Foster Therapeutic [place]  Flowsheets (Taken 3/6/2021 1511)  Trust Relationship/Rapport:   care explained   reassurance provided   choices provided   thoughts/feelings  acknowledged   emotional support provided   empathic listening provided   questions answered   questions encouraged     Problem: Adult Behavioral Health Plan of Care  Goal: Develops/Participates in Therapeutic [place] to Support Successful Transition  Intervention: Mutually Develop Transition Plan  Flowsheets  Taken 3/6/2021 1511  Transition Support:   community resources reviewed   crisis management plan promoted   follow-up care discussed  Taken 3/6/2021 1509  Discharge Coordination/Progress: Patient has Medicare A&B/ant[place] medicaid secondary.  Patient may need assistance with transportation.  [facility]  Transportation Anticipated: family or friend will provide  Current Discharge Risk: substance use/abuse  Concerns to be Addressed: substance/tobacco abuse/use  Readmission Within the Last 30 Days: no previous admission in last 30 days  Patient/Family Anticipated Services at Transition: rehabilitation services  Patient's Choice of Community Agency(s): patient has reported interest in attend residential treatment-patient has Medicare A&B  Patient/Family Anticipates Transition to: inpatient rehabilitation facility  Offered/Gave Vendor List: no   Goal Outcome Evaluation:  Plan of Care Reviewed With: patient  Progress: no change  Outcome Summary: Briefly reviewed plan of care and completed adult social history.    DATA:         Therapist met individually with patient this date to introduce role and to discuss hospitalization expectations. Patient agreeable.      Clinical Maneuvering/Intervention:     Therapist assisted patient in processing above session content; acknowledged and normalized patient’s thoughts, feelings, and concerns.  Discussed the therapist/patient relationship and explain the parameters and limitations of relative confidentiality.  Also discussed the importance of active participation, and honesty to the treatment process.  Encouraged the patient to  discuss/vent their feelings, frustrations, and fears concerning their ongoing medical issues and validated their feelings.     Discussed the importance of finding enjoyable activities and coping skills that the patient can engage in a regular basis. Discussed healthy coping skills such as distraction, self love, grounding, thought challenges/reframing, etc.  Provided patient with list of healthy coping skills this date. Discussed the importance of medication compliance.  Praised the patient for seeking help and spent the majority of the session building rapport.       Allowed patient to freely discuss issues without interruption or judgment. Provided safe, confidential environment to facilitate the development of positive therapeutic relationship and encourage open, honest communication.      Therapist addressed discharge safety planning this date. Assisted patient in identifying risk factors which would indicate the need for higher level of care after discharge;  including thoughts to harm self or others and/or self-harming behavior. Encouraged patient to call 911, or present to the nearest emergency room should any of these events occur. Discussed crisis intervention services and means to access.  Encouraged securing any objects of harm.       Therapist completed integrated summary, treatment plan, and initiated social history this date.  Therapist is strongly encouraging family involvement in treatment.       ASSESSMENT:      The patient is a 55 year old, , disabled male residing in [facility].  Patient presents requesting medical detox from daily alcohol misuse reports drinking 1/2 gallon in 2 days.  He has recently stayed a few days with his sister.  Patient reports a history of Bipolar and paranoid schizophrenia.  He reports taking Depakote and Seroquel.  His last admission here was in October 2017.  Patient arrived on the unit this morning and was very tired.  Staff assisted him with  changing clothes and getting into bed.  He did discuss the interest of engaging in residential [facility] treatment.  He has Medicare A&B/ant[place] medicaid secondary.  Efforts will continue to engage in safe disposition planning.     PLAN:       Patient to remain hospitalized this date.     Treatment team will focus efforts on stabilizing patient's acute symptoms while providing education on healthy coping and crisis management to reduce hospitalizations.   Patient requires daily psychiatrist evaluation and 24/7 nursing supervision to promote patient  safety.     Therapist will offer 1-4 individual sessions, 1 therapy group daily, family education, and appropriate referral.    Therapist recommends residential [facility] treatment and patient has expressed interest.  Efforts will continue to engage in safe disposition planning.

## 2021-03-06 NOTE — ED NOTES
RN attempted to contact pt's sister on cell phone x2 and left a voicemail. Pt states he has a history of seizures relating to a heart attack he had a few years ago. Pt denies having seizures relating to withdrawal.      Ysabel Shepard RN  03/05/21 5565

## 2021-03-06 NOTE — PROGRESS NOTES
Review of Systems      Constitutional:  Appears well-developed and well-nourished.   HENT:   Head: Normocephalic and atraumatic.   Right Ear: External ear normal.   Left Ear: External ear normal.   Mouth/Throat: Oropharynx is clear and moist.   Eyes: Pupils are equal, round, and reactive to light. Conjunctivae and EOM are normal.   Neck: Normal range of motion. Neck supple.   Cardiovascular: Normal rate, regular rhythm and normal heart sounds.    Pulmonary/Chest: Effort normal and breath sounds normal. No respiratory distress. No wheezes.   Abdominal: Soft. Bowel sounds are normal.No distension. There is no tenderness.   Musculoskeletal: Normal range of motion. No edema or deformity.   Neurological:No cranial nerve deficit. Coordination normal.   Skin: Skin is warm and dry. No rash noted. No erythema.     DATA  Labs reviewed. Glucose 175, Blood alcohol level 312 mg/dL, UDS negative,   EKG reviewed. Pending  NILE reviewed.   Record reviewed. The patient was last here in 2017 and was admitted to [facility] unit and treated for depression and suicidal ideation and diagnosed with bipolar II disorder, and discharged on Seroqeul, Depakote and Campral.    DX    Alcohol use disorder, severe, dependence (CMS/HCC)  - Ativan detox  - Thiamine and folate      Diabetes mellitus (CMS/HCC)  - Metformin  - Tradjenta  - Sliding scale coverage      Hypertension  - Metoprolol      Seizures (CMS/HCC)  - Depakote and Keppra      Hyperlipidemia  - Lipitor      Anxiety  - Buspar

## 2021-03-06 NOTE — NURSING NOTE
Pt presented to [facility] during Epic downtime for Chemical Dependancy; Labs/EKG/pt Hx reviewed per fax with Dr Boogie; Accepted for admission SP4; Ativan Detox for alcohol abuse; called Gina at [facility] and made her aware.

## 2021-03-06 NOTE — PLAN OF CARE
Problem: Adult Behavioral Health Plan of Care  Goal: Plan of Care Review  Outcome: Ongoing, Progressing  Flowsheets  Taken 3/6/2021 1644  Progress: no change  Plan of Care Reviewed With: patient  Patient Agreement with Plan of Care: agrees  Taken 3/6/2021 0935  Plan of Care Reviewed With: patient  Patient Agreement with Plan of Care: agrees   Goal Outcome Evaluation:  Plan of Care Reviewed With: patient  Progress: no change   New admit from [facility] @ 0935 for alcohol withdrawal.

## 2021-03-06 NOTE — CONSULTS
"Kt Cottrell David  1965    TIME: [id number]    Is patient agreeable to admission/treatment? Yes    Guardian: (Must have paperwork) FAMILY MEMBER - GUARDIAN: pt is own guardian    Pt Lives With:  Pt has been staying with his sister for a couple days    Highest Level of Education: pt did not report     Presenting Problems: (How is the patient a danger to self or others?) pt presents to the ED with a blood alcohol content of 312. The pt wants to go through detox and complete treatment.    Current Stressors: chemical dependency/abuse, medical diagnosis/condition and mental health condition    Depression: 10; pt reported that \" I don't feel like I want to live anymore\"    Anxiety: 10    Previous Psychiatric Treatment: Yes    If yes, describe: Inpatient psychiatric care, outpatient med management    Last inpatient admission: pt reported many years ago    Number of admissions: 1 pt only reported one admission    Last outpatient visit: pt did not report    Suicidal: Absent; pt denied but later reported he did not feel like he wants to live anymore    Previous Attempts: 1  prior suicide attempt    Number of Previous Attempts: 1    Most Recent Attempt: 10-12 years ago per pt report    [place]-SUICIDE SEVERITY RATING SCALE  Psychiatric Inpatient Setting - Discharge Screener    Ask questions that are bold and underlined Discharge   Ask Questions 1 and 2 YES NO   1) Wish to be Dead:   Person endorses thoughts about a wish to be dead or not alive anymore, or wish to fall asleep and not wake up.  While you were here in the hospital, have you wished you were dead or wished you could go to sleep and not wake up?  x   2) Suicidal Thoughts:   General non-specific thoughts of wanting to end one's life/die by suicide, “I've thought about killing myself” without general thoughts of ways to kill oneself/associated methods, intent, or plan.   While you were here in the hospital, have you actually had thoughts about killing " yourself?   x   If YES to 2, ask questions 3, 4, 5, and 6.  If NO to 2, go directly to question 6   3) Suicidal Thoughts with Method (without Specific Plan or Intent to Act):   Person endorses thoughts of suicide and has thought of a least one method during the assessment period. This is different than a specific plan with time, place or method details worked out. “I thought about taking an overdose but I never made a specific plan as to when where or how I would actually do it….and I would never go through with it.”   Have you been thinking about how you might kill yourself?      4) Suicidal Intent (without Specific Plan):   Active suicidal thoughts of killing oneself and patient reports having some intent to act on such thoughts, as opposed to “I have the thoughts but I definitely will not do anything about them.”   Have you had these thoughts and had some intention of acting on them or do you have some intention of acting on them after you leave the hospital?      5) Suicide Intent with Specific Plan:   Thoughts of killing oneself with details of plan fully or partially worked out and person has some intent to carry it out.   Have you started to work out or worked out the details of how to kill yourself either for while you were here in the hospital or for after you leave the hospital? Do you intend to carry out this plan?        6) Suicide Behavior    While you were here in the hospital, have you done anything, started to do anything, or prepared to do anything to end your life?    Examples: Took pills, cut yourself, tried to hang yourself, took out pills but didn't swallow any because you changed your mind or someone took them from you, collected pills, secured a means of obtaining a gun, gave away valuables, wrote a will or suicide note, etc.         Family Hx of Mental Health/Substance Abuse: Bipolar, substance use, depression, anxiety    Delusions: pt displays linear rational thought     Hallucinations:  Auditory, Visual and Not demonstrated today; pt reported not since 2-3 days ago.    Mood: depressed     Homicidal Ideations: Absent     Abuse History: Further details: pt did not disclose     Does this require reporting: N/A    Legal History / History of Violence: The patient has had legal significant legal charges for PI and assault.     Sleep: Poor    Appetite: Poor    Current Medical Conditions: Yes    If yes, explain: hx of stroke, seizures, heart attack, bipolar disorder and paranoid schizophrenia    Current Psychiatric Medications: Depakote 500mg, Seroquel 300 mg     History of Inappropriate Sexual Behavior: N/A    Hopelessness: Moderate    Orientation: alert and oriented to person, place, and time     Substance Abuse: regular daily use    COWS: 0    CIWA: 12    Withdrawal Symptoms: Restless, tremors, light sensitivity, irritability, confusion,     History of DT's: N/A    History of Seizures: Yes    SUBSTANCE ABUSE HISTORY:      DRUG   PRESENT USE  Y/N   AGE @ 1ST USE    ROUTE   HOW MUCH   HOW OFTEN   HOW LONG AT THIS RATE   Date of last use/  Amount used   Nicotine   y  smoking 1 pack 4-5 days   3/5/2021   Alcohol   y Since childhood oral 1/2 gallon of vodka 2 days Last couple of years 3/5/2021   Marijuana            Benzos              Neurontin            Methadone            Opiates              Cocaine             Heroin            Meth            Suboxone              If in active addiction, do living arrangements affect recovery?:      DATA:   This therapist received a call from [facility] staff (JANINE Neal) with orders from (Randy Lange) for a behavioral health consult.  The patient serves as his own guardian and is agreeable to speak with me.  Met with patient at bedside. Patient is not under 1:1 security monitoring during assessment.  Patient is a 55 year old, not , , male residing in [place], [place]. Patient currently lives with his sister.  Patient is unemployed, disabled.       Patient presents today with chief compliant of substance abuse.   pt presents to the ED with a blood alcohol content of 312. The pt wants to go through detox and complete treatment. The pt reports that over the last couple of years he has been drinking a 1/2 gallon of vodka every 2 days. The pt reported that there are days without liquor that he cannot get up or stand up. The pt reported that he wants to get detox help. The pt reported a hx of withdrawal symptoms and reported having light sensitivity, noise sensitivity, nausea, tremors, feeling confused, and irritable. The pt reported a hx of seizures related to medical concerns but also possibly related to withdrawal. The pt denied current AH/VH but reported that he was hearing a seeing things 2-3 days ago. The pt reported that he has been diagnosed with bipolar disorder and paranoid schizophrenia. The pt denied SI but then later reported that he just doesn't feel like he wants to live anymore. The pt has a hx of at least one reported SI attempt through cutting on his wrist.     The pt reports being unstable on his feet at times and having trouble breathing. Pt reports 2 strokes and a heart attack 2 years ago.     Patient reports to be agreeable for treatment recommendations.     ASSESSMENT:    Therapist completed CSSRS with patient for suicide risk assessment.  The results of patient’s CSSRS suggest that patient is moderate risk for suicide as evidenced by increased alcohol use, diagnosis, and hx of SI.  Patient holds attention and is Cooperative with assessment.  Patient’s appearance is disheveled, unkempt.  The patient displays Restless psychomotor behavior. The patient's affect appears flat. The patient is observed to have normal rate, tone and rhythm of speech.   Patient observed to have Poor and Non-sustained eye contact. The patient's displays poor insight, with poor impulse control and fair judgement.     PLAN:    At this time, therapist recommends  inpatient treatment based upon above assessment . Patient reports to be agreeable to the recommendations.  Therapist informed [facility] ED treatment team members, JANINE agrawal and SOURAV Padilla who are agreeable to plan.  [facility] and [facility].      330: The therapist called [facility] who reported that at this time they have to deny the pt due to other medical concerns and the pt's inability to complete ADL's.    330: Jeannine from [facility] called the therapist to inform her that the pt was accepted as a direct admit under Dr. Boogie. The therapist called [facility] and the pt will transport to [facility] upon [facility] arrival.      JUAN A Noyola

## 2021-03-06 NOTE — H&P
substance use and withdrawals. The patient reports a long history of substance use. First use was when he was a child.. Over time the use increased and the patient  continued to use despite negative consequences. The patient endorses symptoms of tolerance and withdrawals. Has tried to cut down and stop but has not been successful. Spends too much time and resources in pursuit of substance use. Longest period of sobriety is reported to be 2 months.  Currently using 1/2 gallon of vodka every 2 days.  Last use 03-  Withdrawal symptoms nausea,tremors,restless,confusion,irritability, and light sensitivity.  Patient states that he has a history of seizures with withdrawal.  Patient rates his appetite as poor. Patient rates his sleep as poor. Patient rates his anxiety on a scale of 1/10 with 10 being the most severe a 10. Patient rates his depression on a scale of 1/10 with 10 being the most severe a 10. Patient CIWA is 12.

## 2021-03-07 LAB
GLUCOSE BLDC GLUCOMTR-MCNC: 120 MG/DL (ref 70–130)
GLUCOSE BLDC GLUCOMTR-MCNC: 128 MG/DL (ref 70–130)

## 2021-03-07 PROCEDURE — 99223 1ST HOSP IP/OBS HIGH 75: CPT | Performed by: PSYCHIATRY & NEUROLOGY

## 2021-03-07 PROCEDURE — 82962 GLUCOSE BLOOD TEST: CPT

## 2021-03-07 RX ADMIN — DIVALPROEX SODIUM 500 MG: 500 TABLET, FILM COATED, EXTENDED RELEASE ORAL at 21:21

## 2021-03-07 RX ADMIN — LORAZEPAM 2 MG: 2 TABLET ORAL at 09:01

## 2021-03-07 RX ADMIN — ACETAMINOPHEN 650 MG: 325 TABLET ORAL at 09:04

## 2021-03-07 RX ADMIN — METOPROLOL TARTRATE 25 MG: 25 TABLET, FILM COATED ORAL at 09:00

## 2021-03-07 RX ADMIN — LEVETIRACETAM 500 MG: 500 TABLET, FILM COATED ORAL at 21:21

## 2021-03-07 RX ADMIN — NICOTINE TRANSDERMAL SYSTEM 1 PATCH: 21 PATCH, EXTENDED RELEASE TRANSDERMAL at 09:15

## 2021-03-07 RX ADMIN — TRAZODONE HYDROCHLORIDE 50 MG: 50 TABLET ORAL at 21:21

## 2021-03-07 RX ADMIN — DIVALPROEX SODIUM 500 MG: 500 TABLET, FILM COATED, EXTENDED RELEASE ORAL at 09:00

## 2021-03-07 RX ADMIN — TAMSULOSIN HYDROCHLORIDE 0.4 MG: 0.4 CAPSULE ORAL at 09:00

## 2021-03-07 RX ADMIN — ATORVASTATIN CALCIUM 20 MG: 20 TABLET, FILM COATED ORAL at 09:06

## 2021-03-07 RX ADMIN — LORAZEPAM 2 MG: 2 TABLET ORAL at 14:26

## 2021-03-07 RX ADMIN — PANTOPRAZOLE SODIUM 40 MG: 40 TABLET, DELAYED RELEASE ORAL at 09:00

## 2021-03-07 RX ADMIN — METFORMIN HYDROCHLORIDE 1000 MG: 500 TABLET ORAL at 09:05

## 2021-03-07 RX ADMIN — Medication 2 TABLET: at 09:04

## 2021-03-07 RX ADMIN — METFORMIN HYDROCHLORIDE 1000 MG: 500 TABLET ORAL at 17:46

## 2021-03-07 RX ADMIN — Medication 100 MG: at 09:00

## 2021-03-07 RX ADMIN — LORAZEPAM 2 MG: 2 TABLET ORAL at 21:21

## 2021-03-07 RX ADMIN — LEVETIRACETAM 500 MG: 500 TABLET, FILM COATED ORAL at 09:06

## 2021-03-07 RX ADMIN — Medication 1 TABLET: at 09:06

## 2021-03-07 RX ADMIN — DIVALPROEX SODIUM 500 MG: 500 TABLET, FILM COATED, EXTENDED RELEASE ORAL at 15:43

## 2021-03-07 RX ADMIN — BUSPIRONE HYDROCHLORIDE 5 MG: 10 TABLET ORAL at 09:00

## 2021-03-07 RX ADMIN — LINAGLIPTIN 5 MG: 5 TABLET, FILM COATED ORAL at 09:06

## 2021-03-07 NOTE — PLAN OF CARE
Problem: Adult Behavioral Health Plan of Care  Goal: Plan of Care Review  3/7/2021 1853 by Philly Reynaga RN  Outcome: Ongoing, Progressing  Flowsheets (Taken 3/7/2021 1853)  Progress: no change  Plan of Care Reviewed With: patient  Patient Agreement with Plan of Care: agrees  Outcome Summary: pt pleasant and cooperative.   Goal Outcome Evaluation:  Plan of Care Reviewed With: patient  Progress: no change  Outcome Summary: pt pleasant and cooperative.

## 2021-03-07 NOTE — H&P
INITIAL PSYCHIATRIC HISTORY & PHYSICAL    Patient Identification:  Name:   Kt Hernandez  Age:   55 y.o.  Sex:   male  :   1965  MRN:   [id number]  Visit Number:   [id number]  Primary Care Physician:   Provider, No Known    SUBJECTIVE    CC/Focus of Exam:   Alcohol use disorder, severe, dependence     HPI: Kt Hernandez is a 55 y.o. male who was admitted on 3/6/2021 with complaints of alcohol use and withdrawals. The patient reports a long history of substance use. First use was early teens. Over time the use increased and the patient  continued to use despite negative consequences. The patient endorses symptoms of tolerance and withdrawals. Has tried to cut down and stop but has not been successful. Spends too much time and resources in pursuit of substance use. Longest period of sobriety is reported to be 1 month.  Currently using 1/2 gallon of vodka daily.  Last use 2021  Withdrawal symptoms nausea,tremors,restless,confusion,irritability and light sesitivity.  Patient denies any substance abuse. Patient states that he uses tobacco. Patient denies any history of physical,mental or sexual abuse. Patient rates his appetite as poor. Patient rates his sleep as poor. Patient denies any nightmares. Patient rates his anxiety on a scale of 1/10 with 10 being the most severe a 10. Patient rates his depression on a scale of 1/10 with 10 being the most severe a 10. Patient denies any cravings. Patient CIWA is 12. Patient states that he has suicidal ideation. Patient denies any homicidal ideation. Patient states that he has hallucinations. Patient states that he see's and hears things. Patient states that he has 1 prior suicide attempt where he cut his wrist. Patient states he has a history of seizures with withdrawal. Patient was admitted to [facility] psychiatry for further safety and stabilization.     Available medical/psychiatric records reviewed and incorporated into the  current document.     PAST PSYCHIATRIC HX: Patient has had 1 prior admission being 10- - 10-.    SUBSTANCE USE HX: UDS is negative. See HPI for current use.    SOCIAL HX: Patient states that he currently resides with his sister in [place], [place]. Patient states that he is currently disabled.    Past Medical History:   Diagnosis Date   • Alcoholism (CMS/HCC)    • Bipolar depression (CMS/HCC)    • Depression    • Diabetes mellitus (CMS/HCC)    • Hypertension    • Myocardial infarction (CMS/HCC)    • Schizophrenia (CMS/HCC)    • Seizures (CMS/HCC)        History reviewed. No pertinent surgical history.    Family History   Problem Relation Age of Onset   • Alcohol abuse Sister          Medications Prior to Admission   Medication Sig Dispense Refill Last Dose   • atorvastatin (LIPITOR) 20 MG tablet Take 20 mg by mouth Daily.   3/5/2021 at Unknown time   • busPIRone (BUSPAR) 5 MG tablet Take 5 mg by mouth Daily.   3/5/2021 at Unknown time   • divalproex (DEPAKOTE ER) 500 MG 24 hr tablet Take 500 mg by mouth 3 (Three) Times a Day.   3/5/2021 at Unknown time   • levETIRAcetam (KEPPRA) 500 MG tablet Take 500 mg by mouth 2 (Two) Times a Day.   3/5/2021 at Unknown time   • metFORMIN (GLUCOPHAGE) 1000 MG tablet Take 1,000 mg by mouth 2 (Two) Times a Day With Meals.   3/5/2021 at Unknown time   • metoprolol tartrate (LOPRESSOR) 25 MG tablet Take 25 mg by mouth Daily.   3/5/2021 at Unknown time   • pantoprazole (PROTONIX) 40 MG EC tablet Take 40 mg by mouth Daily.   3/5/2021 at Unknown time   • SITagliptin (JANUVIA) 100 MG tablet Take 100 mg by mouth Daily.   3/5/2021 at Unknown time   • tamsulosin (FLOMAX) 0.4 MG capsule 24 hr capsule Take 1 capsule by mouth Daily.   3/5/2021 at Unknown time           ALLERGIES:  Patient has no known allergies.    Temp:  [97 °F (36.1 °C)-99.1 °F (37.3 °C)] 97.9 °F (36.6 °C)  Heart Rate:  [79-91] 84  Resp:  [18-20] 18  BP: (127-145)/(80-93) 128/80    REVIEW OF SYSTEMS:  Review of  Systems   Constitutional: Positive for fatigue.   HENT: Negative.    Eyes: Negative.    Respiratory: Negative.    Cardiovascular: Negative.    Gastrointestinal: Positive for nausea.   Endocrine: Negative.    Genitourinary: Negative.    Musculoskeletal: Positive for myalgias.   Skin: Negative.    Allergic/Immunologic: Negative.    Neurological: Positive for tremors and weakness.   Psychiatric/Behavioral: Positive for dysphoric mood. The patient is nervous/anxious.         OBJECTIVE    PHYSICAL EXAM:  Physical Exam  Constitutional:  Appears well-developed and well-nourished.   HENT:   Head: Normocephalic and atraumatic.   Right Ear: External ear normal.   Left Ear: External ear normal.   Mouth/Throat: Oropharynx is clear and moist.   Eyes: Pupils are equal, round, and reactive to light. Conjunctivae and EOM are normal.   Neck: Normal range of motion. Neck supple.   Cardiovascular: Normal rate, regular rhythm and normal heart sounds.    Pulmonary/Chest: Effort normal and breath sounds normal. No respiratory distress. No wheezes.   Abdominal: Soft. Bowel sounds are normal.No distension. There is no tenderness.   Musculoskeletal: Normal range of motion. No edema or deformity.   Neurological:No cranial nerve deficit. Coordination normal.   Skin: Skin is warm and dry. No rash noted. No erythema.       MENTAL STATUS EXAM:               Hygiene:   fair  Cooperation:  Cooperative  Eye Contact:  Good  Psychomotor Behavior:  Appropriate  Affect:  Appropriate  Hopelessness: 3  Speech:  Normal  Thought Progress:  Goal directed  Thought Content:  Normal  Suicidal:  Suicidal Ideation  Homicidal:  None  Hallucinations:  Auditory and Visual  Delusion:  None  Memory:  Intact  Orientation:  Person, Place and Time  Reliability:  poor  Insight:  Poor  Judgement:  Poor  Impulse Control:  Poor      Imaging Results (Last 24 Hours)     ** No results found for the last 24 hours. **           ECG/EMG Results (most recent)     None            Lab Results   Component Value Date    GLUCOSE 43 (C) 03/05/2021    BUN 12 03/05/2021    CREATININE 1.07 03/05/2021    EGFRIFNONA 71 10/18/2017    EGFRIFAFRI 87 03/05/2021    BCR 11.2 03/05/2021    CO2 15.5 (L) 03/05/2021    CALCIUM 9.4 03/05/2021    ALBUMIN 4.60 03/05/2021    LABIL2 1.3 12/25/2014    AST 19 03/05/2021    ALT 7 03/05/2021       Lab Results   Component Value Date    WBC 10.04 03/05/2021    HGB 13.5 03/05/2021    HCT 41.2 03/05/2021    MCV 91.4 03/05/2021     03/05/2021       Pain Management Panel     Pain Management Panel Latest Ref Rng & Units 3/5/2021 10/23/2020    AMPHETAMINES SCREEN, URINE Negative Negative Negative    BARBITURATES SCREEN Negative Negative Negative    BENZODIAZEPINE SCREEN, URINE Negative Negative Negative    BUPRENORPHINEUR Negative Negative Negative    COCAINE SCREEN, URINE Negative Negative Negative    METHADONE SCREEN, URINE Negative Negative Negative    METHAMPHETAMINEUR Negative Negative Negative          Brief Urine Lab Results  (Last result in the past 365 days)      Color   Clarity   Blood   Leuk Est   Nitrite   Protein   CREAT   Urine HCG        03/05/21 1743 Yellow Clear Negative Negative Negative Negative               DATA  Labs reviewed. Glucose 175, Blood alcohol level 312 mg/dL, UDS negative,   EKG reviewed. Pending  NILE reviewed.   Record reviewed. The patient was last here in 2017 and was admitted to [facility] unit and treated for depression and suicidal ideation and diagnosed with bipolar II disorder, and discharged on Seroqeul, Depakote and Campral.    ASSESSMENT & PLAN:        Alcohol use disorder, severe, dependence (CMS/[facility])  - Ativan detox  - Thiamine and folate       Diabetes mellitus (CMS/[facility])  - Metformin  - Tradjenta  - Sliding scale coverage       Hypertension  - Metoprolol       Seizures (CMS/[facility])  - Depakote and Keppra       Hyperlipidemia  - Lipitor       Anxiety  - Buspar      The patient has been admitted for safety and stabilization.   Patient will be monitored for suicidality daily and maintained on Special Precautions Level 4 (q30 min checks).  The patient will have individual and group therapy with a master's level therapist. A master treatment plan will be developed and agreed upon by the patient and his/her treatment team.  The patient's estimated length of stay in the hospital is 5-7 days.       Written by Bren Cruz acting as scribe for Bel Boogie MD  signature on this note affirms that the note adequately documents the care provided.     rBen Cruz MA  03/07/21  1:46 PM EST    I, Bel Boogie MD, personally performed the services described in this documentation as scribed by the above named individual in my presence, and it is both accurate and complete.

## 2021-03-07 NOTE — PROGRESS NOTES
"INPATIENT PSYCHIATRIC PROGRESS NOTE    Name:  Kt Hernandez  :  1965  MRN:  [id number]  Visit Number:  [id number]  Length of stay:  1    SUBJECTIVE  CC/Focus of Exam: Alcohol use    INTERVAL HISTORY:  The patient reports he is feeling some better.  He reports ongoing weakness and tremors.  Denies any auditory or visual hallucinations.  Depression rating 5/10  Anxiety rating 5/10  Sleep: Better  Withdrawal sx: Tremors, weakness  Cravin/10    Review of Systems   Respiratory: Negative.    Cardiovascular: Negative.    Gastrointestinal: Negative.    Neurological: Positive for tremors and weakness.   Psychiatric/Behavioral: Positive for dysphoric mood. The patient is nervous/anxious.        OBJECTIVE    Temp:  [97 °F (36.1 °C)-99.1 °F (37.3 °C)] 97.9 °F (36.6 °C)  Heart Rate:  [79-91] 84  Resp:  [18-20] 18  BP: (127-145)/(80-93) 128/80    MENTAL STATUS EXAM:  Appearance:Casually dressed, good hygeine.   Cooperation:Cooperative  Psychomotor: No psychomotor agitation/retardation, No EPS, No motor tics  Speech-normal rate, amount.  Mood \"better\"   Affect-congruent, appropriate, stable  Thought Content-goal directed, no delusional material present  Thought process-linear, organized.  Suicidality: No SI  Homicidality: No HI  Perception: No AH/VH  Insight-fair   Judgement-fair    Lab Results (last 24 hours)     Procedure Component Value Units Date/Time    POC Glucose Once [[id number]]  (Normal) Collected: 21 0605    Specimen: Blood Updated: 21 0612     Glucose 120 mg/dL     POC Glucose Once [[id number]]  (Abnormal) Collected: 21 2146    Specimen: Blood Updated: 21 2152     Glucose 146 mg/dL              Imaging Results (Last 24 Hours)     ** No results found for the last 24 hours. **             ECG/EMG Results (most recent)     None           ALLERGIES: Patient has no known allergies.      Current Facility-Administered Medications:   •  acetaminophen (TYLENOL) tablet 650 mg, 650 mg, " Oral, Q6H PRN, Bel Boogie MD, 650 mg at 03/07/21 0904  •  aluminum-magnesium hydroxide-simethicone (MAALOX MAX) 400-400-40 MG/5ML suspension 15 mL, 15 mL, Oral, Q6H PRN, Bel Boogie MD  •  atorvastatin (LIPITOR) tablet 20 mg, 20 mg, Oral, Daily, Bel Boogie MD, 20 mg at 03/07/21 0906  •  B-complex with vitamin C tablet 2 tablet, 2 tablet, Oral, Daily, Bel Boogie MD, 2 tablet at 03/07/21 0904  •  benzonatate (TESSALON) capsule 100 mg, 100 mg, Oral, TID PRN, Bel Boogie MD  •  benztropine (COGENTIN) tablet 2 mg, 2 mg, Oral, Once PRN **OR** benztropine (COGENTIN) injection 1 mg, 1 mg, Intramuscular, Once PRN, Bel Boogie MD  •  busPIRone (BUSPAR) tablet 5 mg, 5 mg, Oral, Daily, Bel Boogie MD, 5 mg at 03/07/21 0900  •  divalproex (DEPAKOTE ER) 24 hr tablet 500 mg, 500 mg, Oral, TID, Bel Boogie MD, 500 mg at 03/07/21 0900  •  famotidine (PEPCID) tablet 20 mg, 20 mg, Oral, BID PRN, Bel Boogie MD  •  hydrOXYzine (ATARAX) tablet 50 mg, 50 mg, Oral, Q6H PRN, Bel Boogie MD  •  ibuprofen (ADVIL,MOTRIN) tablet 400 mg, 400 mg, Oral, Q6H PRN, Bel Boogie MD  •  levETIRAcetam (KEPPRA) tablet 500 mg, 500 mg, Oral, Q12H, Bel Boogie MD, 500 mg at 03/07/21 0906  •  linagliptin (TRADJENTA) tablet 5 mg, 5 mg, Oral, Daily, Bel Boogie MD, 5 mg at 03/07/21 0906  •  loperamide (IMODIUM) capsule 2 mg, 2 mg, Oral, Q2H PRN, Bel Boogie MD  •  LORazepam (ATIVAN) tablet 2 mg, 2 mg, Oral, 3 times per day, 2 mg at 03/07/21 0901 **FOLLOWED BY** [START ON 3/8/2021] LORazepam (ATIVAN) tablet 1.5 mg, 1.5 mg, Oral, 3 times per day **FOLLOWED BY** [START ON 3/9/2021] LORazepam (ATIVAN) tablet 1 mg, 1 mg, Oral, 3 times per day **FOLLOWED BY** [START ON 3/10/2021] LORazepam (ATIVAN) tablet 0.5 mg, 0.5 mg, Oral, 3 times per day, Bel Boogie MD  •  LORazepam (ATIVAN) tablet 2 mg, 2 mg, Oral, Q4H PRN **FOLLOWED BY** [START ON 3/8/2021] LORazepam (ATIVAN) tablet 1.5 mg, 1.5 mg, Oral, Q4H PRN **FOLLOWED BY**  [START ON 3/9/2021] LORazepam (ATIVAN) tablet 1 mg, 1 mg, Oral, Q4H PRN **FOLLOWED BY** [START ON 3/10/2021] LORazepam (ATIVAN) tablet 0.5 mg, 0.5 mg, Oral, Q4H PRN, Bel Boogie MD  •  magnesium hydroxide (MILK OF MAGNESIA) suspension 2400 mg/10mL 10 mL, 10 mL, Oral, Daily PRN, Bel Boogie MD  •  metFORMIN (GLUCOPHAGE) tablet 1,000 mg, 1,000 mg, Oral, BID With Meals, Bel Boogie MD, 1,000 mg at 03/07/21 0905  •  metoprolol tartrate (LOPRESSOR) tablet 25 mg, 25 mg, Oral, Daily, Bel Boogie MD, 25 mg at 03/07/21 0900  •  multivitamin (THERAGRAN) tablet 1 tablet, 1 tablet, Oral, Daily, Bel Boogie MD, 1 tablet at 03/07/21 0906  •  nicotine (NICODERM CQ) 21 MG/24HR patch 1 patch, 1 patch, Transdermal, Q24H, Bel Boogie MD, 1 patch at 03/07/21 0915  •  ondansetron (ZOFRAN) tablet 4 mg, 4 mg, Oral, Q6H PRN, Bel Boogie MD  •  pantoprazole (PROTONIX) EC tablet 40 mg, 40 mg, Oral, Daily, Bel Boogie MD, 40 mg at 03/07/21 0900  •  sodium chloride nasal spray 2 spray, 2 spray, Each Nare, PRN, Bel Boogie MD  •  tamsulosin (FLOMAX) 24 hr capsule 0.4 mg, 0.4 mg, Oral, Daily, Bel Boogie MD, 0.4 mg at 03/07/21 0900  •  thiamine (VITAMIN B-1) tablet 100 mg, 100 mg, Oral, Daily, Bel Boogie MD, 100 mg at 03/07/21 0900  •  traZODone (DESYREL) tablet 50 mg, 50 mg, Oral, Nightly PRN, Bel Boogie MD, 50 mg at 03/06/21 2775    ASSESSMENT & PLAN:      Alcohol use disorder, severe, dependence (CMS/HCC)  - Continue Ativan detox  - Thiamine and folate       Diabetes mellitus (CMS/HCC)  - Metformin  - Tradjenta  - Sliding scale coverage       Hypertension  - Metoprolol       Seizures (CMS/HCC)  - Depakote and Keppra       Hyperlipidemia  - Lipitor       Anxiety  - Buspar    Special precautions: Special Precautions Level 4 (q30 min checks).    Behavioral Health Treatment Plan and Problem List: I have reviewed and approved the Behavioral Health Treatment Plan and Problem list.  The patient has had a chance  to review and agrees with the treatment plan.     Clinician:  Bel Boogie MD  03/07/21  13:02 EST

## 2021-03-07 NOTE — PLAN OF CARE
Problem: Adult Behavioral Health Plan of Care  Goal: Plan of Care Review  3/7/2021 0421 by Shakira Singleton RN  Outcome: Ongoing, Not Progressing  Flowsheets  Taken 3/7/2021 0421  Consent Given to Review Plan with: PT is confused and is unable to fully cooperate with assessment. He does rate his anxiety and depression both 5/10. Noted to have gross tremors. Has required one pass dose of Ativan tonight. A sitter remains at bedside for safety.  Progress: no change  Patient Agreement with Plan of Care: unable to participate  Taken 3/6/2021 2000  Plan of Care Reviewed With: patient  3/7/2021 0419 by Shakira Singleton RN  Outcome: Ongoing, Not Progressing  Flowsheets (Taken 3/6/2021 2000)  Plan of Care Reviewed With: patient  Patient Agreement with Plan of Care: (minimal participation at this time) --  Goal: Patient-Specific Goal (Individualization)  3/7/2021 0421 by Shakira Singleton RN  Outcome: Ongoing, Not Progressing  3/7/2021 0419 by Shakira Singleton RN  Outcome: Ongoing, Not Progressing  Goal: Adheres to Safety Considerations for Self and Others  3/7/2021 0421 by Shakira Singleton RN  Outcome: Ongoing, Not Progressing  3/7/2021 0419 by Shakira Singleton RN  Outcome: Ongoing, Not Progressing  Flowsheets  Taken 3/7/2021 0200  Safety Measures: safety rounds completed  Taken 3/7/2021 0000  Safety Measures: safety rounds completed  Taken 3/6/2021 2200  Safety Measures: safety rounds completed  Taken 3/6/2021 2000  Safety Measures:   suicide assessment completed   safety rounds completed  Intervention: Develop and Maintain Individualized Safety Plan  Description: Identify risk factors for violence to self and others at time of admission, times of risk elevation, and on discharge.  Obtain clinical history including suicidal, homicidal, combative, assaultive, or aggressive behavior.  Discuss safety concerns with patient; develop a corresponding safety plan.  Provide immediate and ongoing protective physical  environment.  Conduct environment of care safety checks; monitor visitors and their possessions.  Be alert to warning signs of suicidal, homicidal, assaultive, or aggressive behavior  Note: Denial of suicidal ideation or agreement to a safety plan does not invalidate suicide risk.  Encourage frequent positive patient-staff interactions to promote verbalization of safety compromising ideations, thoughts or behaviors.  Recent Flowsheet Documentation  Taken 3/7/2021 0200 by Shakira Singleton RN  Safety Measures: safety rounds completed  Taken 3/7/2021 0000 by Shakira Singleton RN  Safety Measures: safety rounds completed  Taken 3/6/2021 2200 by Shakira Singleton RN  Safety Measures: safety rounds completed  Taken 3/6/2021 2000 by Shakira Singleton RN  Safety Measures:   suicide assessment completed   safety rounds completed  Goal: Absence of New-Onset Illness or Injury  3/7/2021 0421 by Shakira Singleton RN  Outcome: Ongoing, Not Progressing  3/7/2021 0419 by Shakira Singleton RN  Outcome: Ongoing, Not Progressing  Intervention: Identify and Manage Fall Risk  Description: Perform standard risk assessment on admission and reassess fall risk frequently, with change in status or transfer to another level of care.  Communicate fall injury risk to interprofessional healthcare team.  Determine need for increased observation, equipment and environmental modification.  Adjust safety measures to individual developmental age and stage and identified risk factors.  Reinforce the importance of safety and activity limitations to patient and family.  Perform regular intentional rounding to assess safety needs.  Recent Flowsheet Documentation  Taken 3/7/2021 0200 by Shakira Singleton RN  Safety Measures: safety rounds completed  Taken 3/7/2021 0000 by Shakira Singleton RN  Safety Measures: safety rounds completed  Taken 3/6/2021 2200 by Shakira Singleton RN  Safety Measures: safety rounds completed  Taken 3/6/2021 2000 by  Areli, Hsakira G, RN  Safety Measures:   suicide assessment completed   safety rounds completed  Goal: Optimized Coping Skills in Response to Life Stressors  3/7/2021 0421 by Shakira Singleton RN  Outcome: Ongoing, Not Progressing  3/7/2021 0419 by Shakira Singleton RN  Outcome: Ongoing, Not Progressing  Intervention: Promote Effective Coping Strategies  Description: Identify current effective and ineffective coping strategies and strengths.  Assist with developing and use of positive coping strategies.  Utilize positive psychology techniques to enhance well-being.  Promote wellness through healthy lifestyle activities.  Consider complementary or alternative approaches.  Provide psychoeducation.  Recent Flowsheet Documentation  Taken 3/6/2021 2000 by Shakira Singleton RN  Supportive Measures: verbalization of feelings encouraged  Goal: Develops/Participates in Therapeutic [place] to Support Successful Transition  3/7/2021 0421 by Shakira Singleton RN  Outcome: Ongoing, Not Progressing  3/7/2021 0419 by Shakira Singleton RN  Outcome: Ongoing, Not Progressing  Intervention: Foster Therapeutic [place]  Description: Establish rapport; develop trust relationship.  Provide a safe space for interaction; convey accept and and respect.  Normalize and validate patient experience; provide emotional support.  Identify and develop realistic, achievable recovery goals via shared decision-making.  Provide person and family-centered care; incorporate family/significant others in assessment and treatment planning.  Honor confidentiality.  Recent Flowsheet Documentation  Taken 3/6/2021 2000 by Shakira Singleton RN  Trust Relationship/Rapport:   care explained   reassurance provided   thoughts/feelings acknowledged   Goal Outcome Evaluation:  Plan of Care Reviewed With: patient  Progress: no change  Outcome Summary: (P) Pt is unable to fully participate in assessment due to confusion and drowsiness.  Rates anxiety 5/10 and  depression 5/10. Has required PRN doses of ativan and a bedside sitter for safety. Disoriented and gross tremors.

## 2021-03-08 LAB
GLUCOSE BLDC GLUCOMTR-MCNC: 101 MG/DL (ref 70–130)
GLUCOSE BLDC GLUCOMTR-MCNC: 105 MG/DL (ref 70–130)

## 2021-03-08 PROCEDURE — 99232 SBSQ HOSP IP/OBS MODERATE 35: CPT | Performed by: PSYCHIATRY & NEUROLOGY

## 2021-03-08 PROCEDURE — 82962 GLUCOSE BLOOD TEST: CPT

## 2021-03-08 RX ADMIN — METOPROLOL TARTRATE 25 MG: 25 TABLET, FILM COATED ORAL at 08:43

## 2021-03-08 RX ADMIN — LEVETIRACETAM 500 MG: 500 TABLET, FILM COATED ORAL at 21:41

## 2021-03-08 RX ADMIN — TRAZODONE HYDROCHLORIDE 50 MG: 50 TABLET ORAL at 21:41

## 2021-03-08 RX ADMIN — Medication 100 MG: at 08:43

## 2021-03-08 RX ADMIN — LORAZEPAM 1.5 MG: 1 TABLET ORAL at 14:47

## 2021-03-08 RX ADMIN — Medication 1 TABLET: at 08:43

## 2021-03-08 RX ADMIN — LEVETIRACETAM 500 MG: 500 TABLET, FILM COATED ORAL at 08:43

## 2021-03-08 RX ADMIN — ATORVASTATIN CALCIUM 20 MG: 20 TABLET, FILM COATED ORAL at 08:44

## 2021-03-08 RX ADMIN — NICOTINE TRANSDERMAL SYSTEM 1 PATCH: 21 PATCH, EXTENDED RELEASE TRANSDERMAL at 08:46

## 2021-03-08 RX ADMIN — METFORMIN HYDROCHLORIDE 1000 MG: 500 TABLET ORAL at 08:43

## 2021-03-08 RX ADMIN — DIVALPROEX SODIUM 500 MG: 500 TABLET, FILM COATED, EXTENDED RELEASE ORAL at 16:47

## 2021-03-08 RX ADMIN — PANTOPRAZOLE SODIUM 40 MG: 40 TABLET, DELAYED RELEASE ORAL at 08:43

## 2021-03-08 RX ADMIN — TAMSULOSIN HYDROCHLORIDE 0.4 MG: 0.4 CAPSULE ORAL at 08:43

## 2021-03-08 RX ADMIN — BUSPIRONE HYDROCHLORIDE 5 MG: 10 TABLET ORAL at 08:43

## 2021-03-08 RX ADMIN — DIVALPROEX SODIUM 500 MG: 500 TABLET, FILM COATED, EXTENDED RELEASE ORAL at 08:43

## 2021-03-08 RX ADMIN — LORAZEPAM 1.5 MG: 1 TABLET ORAL at 21:41

## 2021-03-08 RX ADMIN — DIVALPROEX SODIUM 500 MG: 500 TABLET, FILM COATED, EXTENDED RELEASE ORAL at 21:41

## 2021-03-08 RX ADMIN — METFORMIN HYDROCHLORIDE 1000 MG: 500 TABLET ORAL at 18:07

## 2021-03-08 RX ADMIN — LORAZEPAM 1.5 MG: 1 TABLET ORAL at 08:43

## 2021-03-08 RX ADMIN — Medication 2 TABLET: at 08:44

## 2021-03-08 RX ADMIN — LINAGLIPTIN 5 MG: 5 TABLET, FILM COATED ORAL at 08:43

## 2021-03-08 NOTE — PLAN OF CARE
Problem: Adult Behavioral Health Plan of Care  Goal: Plan of Care Review  Outcome: Ongoing, Progressing  Flowsheets  Taken 3/8/2021 0202  Consent Given to Review Plan with: Pt is notably more coherent today. He is able to follow commands and fully cooperate with assessment. He rates anxiety 4/10 and depression 3/10. Craving 4/10. Continues to have gross tremors, though notably less than yesterday. A sitter remains at bedside for safety. Denies SI/HI and perceptual disturbances.  Progress: improving  Taken 3/7/2021 2016  Plan of Care Reviewed With: patient  Patient Agreement with Plan of Care: agrees  Goal: Patient-Specific Goal (Individualization)  Outcome: Ongoing, Progressing  Goal: Adheres to Safety Considerations for Self and Others  Outcome: Ongoing, Progressing  Flowsheets (Taken 3/7/2021 2016)  Safety Measures: suicide assessment completed  Intervention: Develop and Maintain Individualized Safety Plan  Description: Identify risk factors for violence to self and others at time of admission, times of risk elevation, and on discharge.  Obtain clinical history including suicidal, homicidal, combative, assaultive, or aggressive behavior.  Discuss safety concerns with patient; develop a corresponding safety plan.  Provide immediate and ongoing protective physical environment.  Conduct environment of care safety checks; monitor visitors and their possessions.  Be alert to warning signs of suicidal, homicidal, assaultive, or aggressive behavior  Note: Denial of suicidal ideation or agreement to a safety plan does not invalidate suicide risk.  Encourage frequent positive patient-staff interactions to promote verbalization of safety compromising ideations, thoughts or behaviors.  Recent Flowsheet Documentation  Taken 3/7/2021 2016 by Shakira Singleton RN  Safety Measures: suicide assessment completed  Goal: Absence of New-Onset Illness or Injury  Outcome: Ongoing, Progressing  Intervention: Identify and Manage Fall  Risk  Description: Perform standard risk assessment on admission and reassess fall risk frequently, with change in status or transfer to another level of care.  Communicate fall injury risk to interprofessional healthcare team.  Determine need for increased observation, equipment and environmental modification.  Adjust safety measures to individual developmental age and stage and identified risk factors.  Reinforce the importance of safety and activity limitations to patient and family.  Perform regular intentional rounding to assess safety needs.  Recent Flowsheet Documentation  Taken 3/7/2021 2016 by Shakira Singleton RN  Safety Measures: suicide assessment completed  Goal: Optimized Coping Skills in Response to Life Stressors  Outcome: Ongoing, Progressing  Intervention: Promote Effective Coping Strategies  Description: Identify current effective and ineffective coping strategies and strengths.  Assist with developing and use of positive coping strategies.  Utilize positive psychology techniques to enhance well-being.  Promote wellness through healthy lifestyle activities.  Consider complementary or alternative approaches.  Provide psychoeducation.  Recent Flowsheet Documentation  Taken 3/7/2021 2016 by Shakira Singleton RN  Supportive Measures:   verbalization of feelings encouraged   active listening utilized  Goal: Develops/Participates in Therapeutic [place] to Support Successful Transition  Outcome: Ongoing, Progressing  Intervention: Foster Therapeutic [place]  Description: Establish rapport; develop trust relationship.  Provide a safe space for interaction; convey accept and and respect.  Normalize and validate patient experience; provide emotional support.  Identify and develop realistic, achievable recovery goals via shared decision-making.  Provide person and family-centered care; incorporate family/significant others in assessment and treatment planning.  Honor confidentiality.  Recent Flowsheet  Documentation  Taken 3/7/2021 2016 by Shakira Singleton RN  Trust Relationship/Rapport:   care explained   emotional support provided   reassurance provided   thoughts/feelings acknowledged     Problem: Activity and Energy Impairment (Excessive Substance Use)  Goal: Optimized Energy Level (Excessive Substance Use)  Outcome: Ongoing, Progressing  Intervention: Optimize Energy Level  Description: Encourage activities to promote self-care.  Provide structured exercise options.  Encourage participation in expressive and recreational services.  Utilize mindfulness, stress management and applied relaxation techniques.  Recent Flowsheet Documentation  Taken 3/7/2021 2016 by Shakira Singleton RN  Activity (Behavioral Health): (Activity minimized at this time d/t unsteady gait & tremors)   activity adjusted per tolerance   activity minimized     Problem: Decreased Participation and Engagement (Excessive Substance Use)  Goal: Increased Participation and Engagement (Excessive Substance Use)  Outcome: Ongoing, Progressing  Intervention: Facilitate Participation and Engagement  Description: Provide opportunity for expression of feelings, stressors and self-perception.  Identify and address social and environmental stressors; problem-solve utilizing strengths and resources.  Explore values and expectations to enhance life satisfaction.  Identify activities that were previously gratifying; encourage gradual re-participation and schedule daily pleasurable activities.  Mutually establish recovery goals and vision for the future.  Explore leisure interests and promote development of positive hobbies; provide opportunities for expression.  Encourage program participation and adherence to scheduled activities.  Introduce concepts of the recovery model.  Recent Flowsheet Documentation  Taken 3/7/2021 2016 by Shakira Singleton RN  Supportive Measures:   verbalization of feelings encouraged   active listening utilized     Problem:  Physiologic Impairment (Excessive Substance Use)  Goal: Improved Physiologic Symptoms (Excessive Substance Use)  Outcome: Ongoing, Progressing     Problem: Social, Occupational or Functional Impairment (Excessive Substance Use)  Goal: Enhanced Social, Occupational or Functional Skills (Excessive Substance Use)  Outcome: Ongoing, Progressing  Intervention: Promote Social, Occupational and Functional Ability  Description: Complete a functional assessment; identify deficit areas.  Explore the effect of symptoms or behavior on academic and occupational functioning; evaluate quality-of-life.  Provide family-based services, such as education, emotional support or family coping.  Assess quality of relationships and work interactions; support social engagement and model appropriate social skills.  Provide frequent opportunities to increase, resume and repair interactions with others.  Encourage participation in social-skills training to improve day-to-day living, vocational or recreational skills.  Promote active involvement in social support-based group mutual help program.  Recent Flowsheet Documentation  Taken 3/7/2021 2016 by Shakira Singleton RN  Trust Relationship/Rapport:   care explained   emotional support provided   reassurance provided   thoughts/feelings acknowledged     Problem: Fall Injury Risk  Goal: Absence of Fall and Fall-Related Injury  Outcome: Ongoing, Progressing     Problem: Skin Injury Risk Increased  Goal: Skin Health and Integrity  Outcome: Ongoing, Progressing  Intervention: Optimize Skin Protection  Description: Reassess skin injury risk and inspect skin frequently (e.g., scheduled interval, with turning, with change in condition) to provide optimal prevention.  Maintain adequate tissue perfusion (e.g., encourage fluid balance, avoid crossing legs, constrictive clothing or devices) to promote tissue oxygenation.  Maintain head of bed at lowest degree of elevation tolerated, considering medical  condition and other restrictions. Limit amount of time head of bed is elevated greater than 30 degrees to prevent friction/shear injury.  Avoid positioning onto an area that remains reddened.  Minimize incontinence and moisture (e.g., toileting schedule; moisture-wicking pad, diaper or incontinence collection device, skin moisture barrier).  Cleanse skin promptly and gently when soiled utilizing a pH-balanced cleanser.  Relieve and redistribute pressure (e.g., schedule position changes; utilize higher specification foam mattress, chair cushion, constant low-pressure or alternating-pressure support surface; medical device repositioning; protective dressing applicatio  Support increased progressive functional activity (e.g., therapeutic exercise) to decrease risk associated with immobility. Balance rest with activity.  Recent Flowsheet Documentation  Taken 3/7/2021 2016 by Shakira Singleton RN  Pressure Reduction Techniques:   frequent weight shift encouraged   weight shift assistance provided   Goal Outcome Evaluation:  Plan of Care Reviewed With: patient  Progress: improving  Outcome Summary: (P) Pt is unable to fully participate in assessment due to confusion and drowsiness.  Rates anxiety 5/10 and depression 5/10. Has required PRN doses of ativan and a bedside sitter for safety. Disoriented and gross tremors.
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
## 2021-03-08 NOTE — PROGRESS NOTES
Navigator is helping Primary Therapist with discharge planning for patient. Navigator completed the following referrals on this day:    [facility] - [phone]  -Sent 3/8

## 2021-03-08 NOTE — PROGRESS NOTES
"INPATIENT PSYCHIATRIC PROGRESS NOTE    Name:  Kt Hernandez  :  1965  MRN:  [id number]  Visit Number:  [id number]  Length of stay:  2    SUBJECTIVE  CC/Focus of Exam: Alcohol use    INTERVAL HISTORY:  The patient reports he is making progress and the withdrawals are getting less intense and the medications are helping.  Depression rating 5/10  Anxiety rating 5/10  Sleep: Better  Withdrawal sx: Tremors, weakness  Cravin/10    Review of Systems   Respiratory: Negative.    Cardiovascular: Negative.    Gastrointestinal: Negative.    Neurological: Positive for tremors and weakness.   Psychiatric/Behavioral: Positive for dysphoric mood. The patient is nervous/anxious.        OBJECTIVE    Temp:  [97.7 °F (36.5 °C)-98.8 °F (37.1 °C)] 97.7 °F (36.5 °C)  Heart Rate:  [84-96] 84  Resp:  [18-20] 18  BP: (120-139)/(73-86) 120/73    MENTAL STATUS EXAM:  Appearance:Casually dressed, good hygeine.   Cooperation:Cooperative  Psychomotor: No psychomotor agitation/retardation, No EPS, No motor tics  Speech-normal rate, amount.  Mood \"better\"   Affect-congruent, appropriate, stable  Thought Content-goal directed, no delusional material present  Thought process-linear, organized.  Suicidality: No SI  Homicidality: No HI  Perception: No AH/VH  Insight-fair   Judgement-fair    Lab Results (last 24 hours)     Procedure Component Value Units Date/Time    POC Glucose Once [[id number]]  (Normal) Collected: 21    Specimen: Blood Updated: 21     Glucose 105 mg/dL              Imaging Results (Last 24 Hours)     ** No results found for the last 24 hours. **             ECG/EMG Results (most recent)     None           ALLERGIES: Patient has no known allergies.      Current Facility-Administered Medications:   •  acetaminophen (TYLENOL) tablet 650 mg, 650 mg, Oral, Q6H PRN, Bel Boogie MD, 650 mg at 21 0904  •  aluminum-magnesium hydroxide-simethicone (MAALOX MAX) 400-400-40 MG/5ML suspension 15 " mL, 15 mL, Oral, Q6H PRN, Bel Boogie MD  •  atorvastatin (LIPITOR) tablet 20 mg, 20 mg, Oral, Daily, Bel Boogie MD, 20 mg at 21  •  B-complex with vitamin C tablet 2 tablet, 2 tablet, Oral, Daily, Bel Boogie MD, 2 tablet at 21  •  benzonatate (TESSALON) capsule 100 mg, 100 mg, Oral, TID PRN, Bel Boogie MD  •  benztropine (COGENTIN) tablet 2 mg, 2 mg, Oral, Once PRN **OR** benztropine (COGENTIN) injection 1 mg, 1 mg, Intramuscular, Once PRN, Bel Boogie MD  •  busPIRone (BUSPAR) tablet 5 mg, 5 mg, Oral, Daily, Bel Boogie MD, 5 mg at 21  •  divalproex (DEPAKOTE ER) 24 hr tablet 500 mg, 500 mg, Oral, TID, Bel Boogie MD, 500 mg at 21  •  famotidine (PEPCID) tablet 20 mg, 20 mg, Oral, BID PRN, Bel Boogie MD  •  hydrOXYzine (ATARAX) tablet 50 mg, 50 mg, Oral, Q6H PRN, Bel Boogie MD  •  ibuprofen (ADVIL,MOTRIN) tablet 400 mg, 400 mg, Oral, Q6H PRN, Bel Boogie MD  •  levETIRAcetam (KEPPRA) tablet 500 mg, 500 mg, Oral, Q12H, Bel Boogie MD, 500 mg at 21  •  linagliptin (TRADJENTA) tablet 5 mg, 5 mg, Oral, Daily, Bel Boogie MD, 5 mg at 21  •  loperamide (IMODIUM) capsule 2 mg, 2 mg, Oral, Q2H PRN, Bel Boogie MD  •  [COMPLETED] LORazepam (ATIVAN) tablet 2 mg, 2 mg, Oral, 3 times per day, 2 mg at 21 **FOLLOWED BY** LORazepam (ATIVAN) tablet 1.5 mg, 1.5 mg, Oral, 3 times per day, 1.5 mg at 21 0843 **FOLLOWED BY** [START ON 3/9/2021] LORazepam (ATIVAN) tablet 1 mg, 1 mg, Oral, 3 times per day **FOLLOWED BY** [START ON 3/10/2021] LORazepam (ATIVAN) tablet 0.5 mg, 0.5 mg, Oral, 3 times per day, Bel Boogie MD  •  [] LORazepam (ATIVAN) tablet 2 mg, 2 mg, Oral, Q4H PRN **FOLLOWED BY** LORazepam (ATIVAN) tablet 1.5 mg, 1.5 mg, Oral, Q4H PRN **FOLLOWED BY** [START ON 3/9/2021] LORazepam (ATIVAN) tablet 1 mg, 1 mg, Oral, Q4H PRN **FOLLOWED BY** [START ON 3/10/2021] LORazepam (ATIVAN) tablet 0.5  mg, 0.5 mg, Oral, Q4H PRN, Bel Boogie MD  •  magnesium hydroxide (MILK OF MAGNESIA) suspension 2400 mg/10mL 10 mL, 10 mL, Oral, Daily PRN, Bel Boogie MD  •  metFORMIN (GLUCOPHAGE) tablet 1,000 mg, 1,000 mg, Oral, BID With Meals, Bel Boogie MD, 1,000 mg at 03/08/21 0843  •  metoprolol tartrate (LOPRESSOR) tablet 25 mg, 25 mg, Oral, Daily, Bel Boogie MD, 25 mg at 03/08/21 0843  •  multivitamin (THERAGRAN) tablet 1 tablet, 1 tablet, Oral, Daily, Bel Boogie MD, 1 tablet at 03/08/21 0843  •  nicotine (NICODERM CQ) 21 MG/24HR patch 1 patch, 1 patch, Transdermal, Q24H, Bel Boogie MD, 1 patch at 03/08/21 0846  •  ondansetron (ZOFRAN) tablet 4 mg, 4 mg, Oral, Q6H PRN, Bel Boogie MD  •  pantoprazole (PROTONIX) EC tablet 40 mg, 40 mg, Oral, Daily, Bel Boogie MD, 40 mg at 03/08/21 0843  •  sodium chloride nasal spray 2 spray, 2 spray, Each Nare, PRN, Bel Boogie MD  •  tamsulosin (FLOMAX) 24 hr capsule 0.4 mg, 0.4 mg, Oral, Daily, Bel Boogie MD, 0.4 mg at 03/08/21 0843  •  thiamine (VITAMIN B-1) tablet 100 mg, 100 mg, Oral, Daily, Bel Boogie MD, 100 mg at 03/08/21 0843  •  traZODone (DESYREL) tablet 50 mg, 50 mg, Oral, Nightly PRN, Bel Boogie MD, 50 mg at 03/07/21 2121    ASSESSMENT & PLAN:      Alcohol use disorder, severe, dependence (CMS/HCC)  - Continue Ativan detox  - Thiamine and folate       Diabetes mellitus (CMS/HCC)  - Metformin  - Tradjenta  - Sliding scale coverage       Hypertension  - Metoprolol       Seizures (CMS/HCC)  - Depakote and Keppra       Hyperlipidemia  - Lipitor       Anxiety  - Buspar    Special precautions: Special Precautions Level 4 (q30 min checks).    Behavioral Health Treatment Plan and Problem List: I have reviewed and approved the Behavioral Health Treatment Plan and Problem list.  The patient has had a chance to review and agrees with the treatment plan.     Clinician:  Bel Boogie MD  03/08/21  11:30 EST

## 2021-03-08 NOTE — PLAN OF CARE
Problem: Adult Behavioral Health Plan of Care  Goal: Plan of Care Review  Outcome: Ongoing, Progressing  Flowsheets  Taken 3/8/2021 1824  Plan of Care Reviewed With: patient  Patient Agreement with Plan of Care: agrees  Taken 3/8/2021 0821  Plan of Care Reviewed With: patient  Patient Agreement with Plan of Care: agrees   Goal Outcome Evaluation:  Plan of Care Reviewed With: patient

## 2021-03-08 NOTE — DISCHARGE PLACEMENT REQUEST
"Donya Hernandez (55 y.o. Male)     Date of Birth Social Security Number Address Home Phone MRN    1965  [address] [id number] [phone] [id number]    Tenriism Marital Status          Non-Mandaen Single       Admission Date Admission Type Admitting Provider Attending Provider Department, Room/Bed    3/6/21 Urgent Bel Boogie MD Salim, Mazhar, MD [facility] ADULT CD, 1030/2S    Discharge Date Discharge Disposition Discharge Destination                       Attending Provider: Bel Boogie MD    Allergies: No Known Allergies    Isolation: None   Infection: COVID (History) (03/06/21)   Code Status: CPR    Ht: 180.3 cm (71\")   Wt: 77.1 kg (170 lb)    Admission Cmt: None   Principal Problem: None                Active Insurance as of 3/6/2021     Primary Coverage     Payor Plan Insurance Group Employer/Plan Group    MEDICARE MEDICARE A & B      Payor Plan Address Payor Plan Phone Number Payor Plan Fax Number Effective Dates    [address] [phone]  6/1/2005 - None Entered    [facility] [id number]       Subscriber Name Subscriber Birth Date Member ID       DONYA HERNANDEZ 1965 [id number]           Secondary Coverage     Payor Plan Insurance Group Employer/Plan Group    ANTH MEDICAID ANTH MEDICAID [id number]     Payor Plan Address Payor Plan Phone Number Payor Plan Fax Number Effective Dates    [address] [address] [phone]  11/1/2019 - None Entered    [facility] [id number]       Subscriber Name Subscriber Birth Date Member ID       DONYA HERNANDEZ 1965 [id number]                 Emergency Contacts      (Rel.) Home Phone Work Phone Mobile Phone    Nehal Aldana (Mother) [phone] -- --    chemallorie (Sister) [phone] -- --               History & Physical      Bel Boogie MD at 03/07/21 1345          INITIAL PSYCHIATRIC HISTORY & PHYSICAL    Patient Identification:  Name:   Donya Cottrell " David  Age:   55 y.o.  Sex:   male  :   1965  MRN:   [id number]  Visit Number:   [id number]  Primary Care Physician:   Provider, No Known    SUBJECTIVE    CC/Focus of Exam:   Alcohol use disorder, severe, dependence     HPI: Kt Hernandez is a 55 y.o. male who was admitted on 3/6/2021 with complaints of alcohol use and withdrawals. The patient reports a long history of substance use. First use was early teens. Over time the use increased and the patient  continued to use despite negative consequences. The patient endorses symptoms of tolerance and withdrawals. Has tried to cut down and stop but has not been successful. Spends too much time and resources in pursuit of substance use. Longest period of sobriety is reported to be 1 month.  Currently using 1/2 gallon of vodka daily.  Last use 2021  Withdrawal symptoms nausea,tremors,restless,confusion,irritability and light sesitivity.  Patient denies any substance abuse. Patient states that he uses tobacco. Patient denies any history of physical,mental or sexual abuse. Patient rates his appetite as poor. Patient rates his sleep as poor. Patient denies any nightmares. Patient rates his anxiety on a scale of 1/10 with 10 being the most severe a 10. Patient rates his depression on a scale of 1/10 with 10 being the most severe a 10. Patient denies any cravings. Patient CIWA is 12. Patient states that he has suicidal ideation. Patient denies any homicidal ideation. Patient states that he has hallucinations. Patient states that he see's and hears things. Patient states that he has 1 prior suicide attempt where he cut his wrist. Patient states he has a history of seizures with withdrawal. Patient was admitted to [facility] psychiatry for further safety and stabilization.     Available medical/psychiatric records reviewed and incorporated into the current document.     PAST PSYCHIATRIC HX: Patient has had 1 prior admission being 10-  - 10-.    SUBSTANCE USE HX: UDS is negative. See HPI for current use.    SOCIAL HX: Patient states that he currently resides with his sister in [place], [place]. Patient states that he is currently disabled.    Past Medical History:   Diagnosis Date   • Alcoholism (CMS/HCC)    • Bipolar depression (CMS/HCC)    • Depression    • Diabetes mellitus (CMS/HCC)    • Hypertension    • Myocardial infarction (CMS/HCC)    • Schizophrenia (CMS/HCC)    • Seizures (CMS/HCC)        History reviewed. No pertinent surgical history.    Family History   Problem Relation Age of Onset   • Alcohol abuse Sister          Medications Prior to Admission   Medication Sig Dispense Refill Last Dose   • atorvastatin (LIPITOR) 20 MG tablet Take 20 mg by mouth Daily.   3/5/2021 at Unknown time   • busPIRone (BUSPAR) 5 MG tablet Take 5 mg by mouth Daily.   3/5/2021 at Unknown time   • divalproex (DEPAKOTE ER) 500 MG 24 hr tablet Take 500 mg by mouth 3 (Three) Times a Day.   3/5/2021 at Unknown time   • levETIRAcetam (KEPPRA) 500 MG tablet Take 500 mg by mouth 2 (Two) Times a Day.   3/5/2021 at Unknown time   • metFORMIN (GLUCOPHAGE) 1000 MG tablet Take 1,000 mg by mouth 2 (Two) Times a Day With Meals.   3/5/2021 at Unknown time   • metoprolol tartrate (LOPRESSOR) 25 MG tablet Take 25 mg by mouth Daily.   3/5/2021 at Unknown time   • pantoprazole (PROTONIX) 40 MG EC tablet Take 40 mg by mouth Daily.   3/5/2021 at Unknown time   • SITagliptin (JANUVIA) 100 MG tablet Take 100 mg by mouth Daily.   3/5/2021 at Unknown time   • tamsulosin (FLOMAX) 0.4 MG capsule 24 hr capsule Take 1 capsule by mouth Daily.   3/5/2021 at Unknown time           ALLERGIES:  Patient has no known allergies.    Temp:  [97 °F (36.1 °C)-99.1 °F (37.3 °C)] 97.9 °F (36.6 °C)  Heart Rate:  [79-91] 84  Resp:  [18-20] 18  BP: (127-145)/(80-93) 128/80    REVIEW OF SYSTEMS:  Review of Systems   Constitutional: Positive for fatigue.   HENT: Negative.    Eyes: Negative.     Respiratory: Negative.    Cardiovascular: Negative.    Gastrointestinal: Positive for nausea.   Endocrine: Negative.    Genitourinary: Negative.    Musculoskeletal: Positive for myalgias.   Skin: Negative.    Allergic/Immunologic: Negative.    Neurological: Positive for tremors and weakness.   Psychiatric/Behavioral: Positive for dysphoric mood. The patient is nervous/anxious.         OBJECTIVE    PHYSICAL EXAM:  Physical Exam  Constitutional:  Appears well-developed and well-nourished.   HENT:   Head: Normocephalic and atraumatic.   Right Ear: External ear normal.   Left Ear: External ear normal.   Mouth/Throat: Oropharynx is clear and moist.   Eyes: Pupils are equal, round, and reactive to light. Conjunctivae and EOM are normal.   Neck: Normal range of motion. Neck supple.   Cardiovascular: Normal rate, regular rhythm and normal heart sounds.    Pulmonary/Chest: Effort normal and breath sounds normal. No respiratory distress. No wheezes.   Abdominal: Soft. Bowel sounds are normal.No distension. There is no tenderness.   Musculoskeletal: Normal range of motion. No edema or deformity.   Neurological:No cranial nerve deficit. Coordination normal.   Skin: Skin is warm and dry. No rash noted. No erythema.       MENTAL STATUS EXAM:               Hygiene:   fair  Cooperation:  Cooperative  Eye Contact:  Good  Psychomotor Behavior:  Appropriate  Affect:  Appropriate  Hopelessness: 3  Speech:  Normal  Thought Progress:  Goal directed  Thought Content:  Normal  Suicidal:  Suicidal Ideation  Homicidal:  None  Hallucinations:  Auditory and Visual  Delusion:  None  Memory:  Intact  Orientation:  Person, Place and Time  Reliability:  poor  Insight:  Poor  Judgement:  Poor  Impulse Control:  Poor      Imaging Results (Last 24 Hours)     ** No results found for the last 24 hours. **           ECG/EMG Results (most recent)     None           Lab Results   Component Value Date    GLUCOSE 43 (C) 03/05/2021    BUN 12 03/05/2021     CREATININE 1.07 03/05/2021    EGFRIFNONA 71 10/18/2017    EGFRIFAFRI 87 03/05/2021    BCR 11.2 03/05/2021    CO2 15.5 (L) 03/05/2021    CALCIUM 9.4 03/05/2021    ALBUMIN 4.60 03/05/2021    LABIL2 1.3 12/25/2014    AST 19 03/05/2021    ALT 7 03/05/2021       Lab Results   Component Value Date    WBC 10.04 03/05/2021    HGB 13.5 03/05/2021    HCT 41.2 03/05/2021    MCV 91.4 03/05/2021     03/05/2021       Pain Management Panel     Pain Management Panel Latest Ref Rng & Units 3/5/2021 10/23/2020    AMPHETAMINES SCREEN, URINE Negative Negative Negative    BARBITURATES SCREEN Negative Negative Negative    BENZODIAZEPINE SCREEN, URINE Negative Negative Negative    BUPRENORPHINEUR Negative Negative Negative    COCAINE SCREEN, URINE Negative Negative Negative    METHADONE SCREEN, URINE Negative Negative Negative    METHAMPHETAMINEUR Negative Negative Negative          Brief Urine Lab Results  (Last result in the past 365 days)      Color   Clarity   Blood   Leuk Est   Nitrite   Protein   CREAT   Urine HCG        03/05/21 1743 Yellow Clear Negative Negative Negative Negative               DATA  Labs reviewed. Glucose 175, Blood alcohol level 312 mg/dL, UDS negative,   EKG reviewed. Pending  NILE reviewed.   Record reviewed. The patient was last here in 2017 and was admitted to AE2 unit and treated for depression and suicidal ideation and diagnosed with bipolar II disorder, and discharged on Seroqeul, Depakote and Campral.    ASSESSMENT & PLAN:        Alcohol use disorder, severe, dependence (CMS/[facility])  - Ativan detox  - Thiamine and folate       Diabetes mellitus (CMS/[facility])  - Metformin  - Tradjenta  - Sliding scale coverage       Hypertension  - Metoprolol       Seizures (CMS/[facility])  - Depakote and Keppra       Hyperlipidemia  - Lipitor       Anxiety  - Buspar      The patient has been admitted for safety and stabilization.  Patient will be monitored for suicidality daily and maintained on Special Precautions Level 4  (q30 min checks).  The patient will have individual and group therapy with a master's level therapist. A master treatment plan will be developed and agreed upon by the patient and his/her treatment team.  The patient's estimated length of stay in the hospital is 5-7 days.       Written by Bren Cruz acting as scribe for Bel Boogie MD  signature on this note affirms that the note adequately documents the care provided.     Bren Cruz MA  03/07/21  1:46 PM Bel MORGAN MD, personally performed the services described in this documentation as scribed by the above named individual in my presence, and it is both accurate and complete.        Electronically signed by Bel Boogie MD at 03/07/21 1815         Current Facility-Administered Medications   Medication Dose Route Frequency Provider Last Rate Last Admin   • acetaminophen (TYLENOL) tablet 650 mg  650 mg Oral Q6H PRN Bel Boogie MD   650 mg at 03/07/21 0904   • aluminum-magnesium hydroxide-simethicone (MAALOX MAX) 400-400-40 MG/5ML suspension 15 mL  15 mL Oral Q6H PRN Bel Boogie MD       • atorvastatin (LIPITOR) tablet 20 mg  20 mg Oral Daily Bel Boogie MD   20 mg at 03/08/21 0844   • B-complex with vitamin C tablet 2 tablet  2 tablet Oral Daily Bel Boogie MD   2 tablet at 03/08/21 0844   • benzonatate (TESSALON) capsule 100 mg  100 mg Oral TID PRN Bel Boogie MD       • benztropine (COGENTIN) tablet 2 mg  2 mg Oral Once PRN Bel Boogie MD        Or   • benztropine (COGENTIN) injection 1 mg  1 mg Intramuscular Once PRN Bel Boogie MD       • busPIRone (BUSPAR) tablet 5 mg  5 mg Oral Daily Bel Boogie MD   5 mg at 03/08/21 0843   • divalproex (DEPAKOTE ER) 24 hr tablet 500 mg  500 mg Oral TID Bel Boogie MD   500 mg at 03/08/21 1647   • famotidine (PEPCID) tablet 20 mg  20 mg Oral BID PRN Bel Boogie MD       • hydrOXYzine (ATARAX) tablet 50 mg  50 mg Oral Q6H PRN Bel Boogie MD       • ibuprofen  (ADVIL,MOTRIN) tablet 400 mg  400 mg Oral Q6H PRN Bel Boogie MD       • levETIRAcetam (KEPPRA) tablet 500 mg  500 mg Oral Q12H Bel Boogie MD   500 mg at 03/08/21 0843   • linagliptin (TRADJENTA) tablet 5 mg  5 mg Oral Daily Bel Boogie MD   5 mg at 03/08/21 0843   • loperamide (IMODIUM) capsule 2 mg  2 mg Oral Q2H PRN Bel Boogie MD       • LORazepam (ATIVAN) tablet 1.5 mg  1.5 mg Oral 3 times per day Bel Boogie MD   1.5 mg at 03/08/21 1447    Followed by   • [START ON 3/9/2021] LORazepam (ATIVAN) tablet 1 mg  1 mg Oral 3 times per day Bel Boogie MD        Followed by   • [START ON 3/10/2021] LORazepam (ATIVAN) tablet 0.5 mg  0.5 mg Oral 3 times per day Bel Boogie MD       • LORazepam (ATIVAN) tablet 1.5 mg  1.5 mg Oral Q4H PRN Bel Boogie MD        Followed by   • [START ON 3/9/2021] LORazepam (ATIVAN) tablet 1 mg  1 mg Oral Q4H PRN Bel Boogie MD        Followed by   • [START ON 3/10/2021] LORazepam (ATIVAN) tablet 0.5 mg  0.5 mg Oral Q4H PRN Bel Boogie MD       • magnesium hydroxide (MILK OF MAGNESIA) suspension 2400 mg/10mL 10 mL  10 mL Oral Daily PRN Bel Boogie MD       • metFORMIN (GLUCOPHAGE) tablet 1,000 mg  1,000 mg Oral BID With Meals Bel Boogie MD   1,000 mg at 03/08/21 0843   • metoprolol tartrate (LOPRESSOR) tablet 25 mg  25 mg Oral Daily Bel Boogie MD   25 mg at 03/08/21 0843   • multivitamin (THERAGRAN) tablet 1 tablet  1 tablet Oral Daily Bel Boogie MD   1 tablet at 03/08/21 0843   • nicotine (NICODERM CQ) 21 MG/24HR patch 1 patch  1 patch Transdermal Q24H Bel Boogie MD   1 patch at 03/08/21 0846   • ondansetron (ZOFRAN) tablet 4 mg  4 mg Oral Q6H PRN Bel Boogie MD       • pantoprazole (PROTONIX) EC tablet 40 mg  40 mg Oral Daily Bel Boogie MD   40 mg at 03/08/21 0843   • sodium chloride nasal spray 2 spray  2 spray Each Nare PRN Bel Boogie MD       • tamsulosin (FLOMAX) 24 hr capsule 0.4 mg  0.4 mg Oral Daily Bel Boogie MD   0.4  "mg at 21 0843   • thiamine (VITAMIN B-1) tablet 100 mg  100 mg Oral Daily Bel Boogie MD   100 mg at 21 0843   • traZODone (DESYREL) tablet 50 mg  50 mg Oral Nightly PRN Bel Boogie MD   50 mg at 21 2121        Physician Progress Notes (last 48 hours) (Notes from 21 1648 through 21 1648)      Bel Boogie MD at 21 1129          INPATIENT PSYCHIATRIC PROGRESS NOTE    Name:  Kt Hernandez  :  1965  MRN:  [id number]  Visit Number:  [id number]  Length of stay:  2    SUBJECTIVE  CC/Focus of Exam: Alcohol use    INTERVAL HISTORY:  The patient reports he is making progress and the withdrawals are getting less intense and the medications are helping.  Depression rating 5/10  Anxiety rating 5/10  Sleep: Better  Withdrawal sx: Tremors, weakness  Cravin/10    Review of Systems   Respiratory: Negative.    Cardiovascular: Negative.    Gastrointestinal: Negative.    Neurological: Positive for tremors and weakness.   Psychiatric/Behavioral: Positive for dysphoric mood. The patient is nervous/anxious.        OBJECTIVE    Temp:  [97.7 °F (36.5 °C)-98.8 °F (37.1 °C)] 97.7 °F (36.5 °C)  Heart Rate:  [84-96] 84  Resp:  [18-20] 18  BP: (120-139)/(73-86) 120/73    MENTAL STATUS EXAM:  Appearance:Casually dressed, good hygeine.   Cooperation:Cooperative  Psychomotor: No psychomotor agitation/retardation, No EPS, No motor tics  Speech-normal rate, amount.  Mood \"better\"   Affect-congruent, appropriate, stable  Thought Content-goal directed, no delusional material present  Thought process-linear, organized.  Suicidality: No SI  Homicidality: No HI  Perception: No AH/VH  Insight-fair   Judgement-fair    Lab Results (last 24 hours)     Procedure Component Value Units Date/Time    POC Glucose Once [[id number]]  (Normal) Collected: 21    Specimen: Blood Updated: 21     Glucose 105 mg/dL              Imaging Results (Last 24 Hours)     ** No results found for " the last 24 hours. **             ECG/EMG Results (most recent)     None           ALLERGIES: Patient has no known allergies.      Current Facility-Administered Medications:   •  acetaminophen (TYLENOL) tablet 650 mg, 650 mg, Oral, Q6H PRN, Bel Boogie MD, 650 mg at 03/07/21 0904  •  aluminum-magnesium hydroxide-simethicone (MAALOX MAX) 400-400-40 MG/5ML suspension 15 mL, 15 mL, Oral, Q6H PRN, Bel Boogie MD  •  atorvastatin (LIPITOR) tablet 20 mg, 20 mg, Oral, Daily, Bel Boogie MD, 20 mg at 03/08/21 0844  •  B-complex with vitamin C tablet 2 tablet, 2 tablet, Oral, Daily, Bel Boogie MD, 2 tablet at 03/08/21 0844  •  benzonatate (TESSALON) capsule 100 mg, 100 mg, Oral, TID PRN, Bel Boogie MD  •  benztropine (COGENTIN) tablet 2 mg, 2 mg, Oral, Once PRN **OR** benztropine (COGENTIN) injection 1 mg, 1 mg, Intramuscular, Once PRN, Bel Boogie MD  •  busPIRone (BUSPAR) tablet 5 mg, 5 mg, Oral, Daily, Bel Boogie MD, 5 mg at 03/08/21 0843  •  divalproex (DEPAKOTE ER) 24 hr tablet 500 mg, 500 mg, Oral, TID, Bel Boogie MD, 500 mg at 03/08/21 0843  •  famotidine (PEPCID) tablet 20 mg, 20 mg, Oral, BID PRN, Bel Boogie MD  •  hydrOXYzine (ATARAX) tablet 50 mg, 50 mg, Oral, Q6H PRN, Bel Boogie MD  •  ibuprofen (ADVIL,MOTRIN) tablet 400 mg, 400 mg, Oral, Q6H PRN, Bel Boogie MD  •  levETIRAcetam (KEPPRA) tablet 500 mg, 500 mg, Oral, Q12H, Bel Boogie MD, 500 mg at 03/08/21 0843  •  linagliptin (TRADJENTA) tablet 5 mg, 5 mg, Oral, Daily, Bel Boogie MD, 5 mg at 03/08/21 0843  •  loperamide (IMODIUM) capsule 2 mg, 2 mg, Oral, Q2H PRN, Bel Boogie MD  •  [COMPLETED] LORazepam (ATIVAN) tablet 2 mg, 2 mg, Oral, 3 times per day, 2 mg at 03/07/21 2121 **FOLLOWED BY** LORazepam (ATIVAN) tablet 1.5 mg, 1.5 mg, Oral, 3 times per day, 1.5 mg at 03/08/21 0843 **FOLLOWED BY** [START ON 3/9/2021] LORazepam (ATIVAN) tablet 1 mg, 1 mg, Oral, 3 times per day **FOLLOWED BY** [START ON 3/10/2021]  LORazepam (ATIVAN) tablet 0.5 mg, 0.5 mg, Oral, 3 times per day, Bel Boogie MD  •  [] LORazepam (ATIVAN) tablet 2 mg, 2 mg, Oral, Q4H PRN **FOLLOWED BY** LORazepam (ATIVAN) tablet 1.5 mg, 1.5 mg, Oral, Q4H PRN **FOLLOWED BY** [START ON 3/9/2021] LORazepam (ATIVAN) tablet 1 mg, 1 mg, Oral, Q4H PRN **FOLLOWED BY** [START ON 3/10/2021] LORazepam (ATIVAN) tablet 0.5 mg, 0.5 mg, Oral, Q4H PRN, Bel Boogie MD  •  magnesium hydroxide (MILK OF MAGNESIA) suspension 2400 mg/10mL 10 mL, 10 mL, Oral, Daily PRN, Bel Boogie MD  •  metFORMIN (GLUCOPHAGE) tablet 1,000 mg, 1,000 mg, Oral, BID With Meals, Bel Boogie MD, 1,000 mg at 2143  •  metoprolol tartrate (LOPRESSOR) tablet 25 mg, 25 mg, Oral, Daily, Bel Boogie MD, 25 mg at 2143  •  multivitamin (THERAGRAN) tablet 1 tablet, 1 tablet, Oral, Daily, Bel Boogie MD, 1 tablet at 2143  •  nicotine (NICODERM CQ) 21 MG/24HR patch 1 patch, 1 patch, Transdermal, Q24H, Bel Boogie MD, 1 patch at 21 0846  •  ondansetron (ZOFRAN) tablet 4 mg, 4 mg, Oral, Q6H PRN, Bel Boogie MD  •  pantoprazole (PROTONIX) EC tablet 40 mg, 40 mg, Oral, Daily, Bel Boogie MD, 40 mg at 2143  •  sodium chloride nasal spray 2 spray, 2 spray, Each Nare, PRN, Bel Boogie MD  •  tamsulosin (FLOMAX) 24 hr capsule 0.4 mg, 0.4 mg, Oral, Daily, Bel Boogie MD, 0.4 mg at 03/08/21 0843  •  thiamine (VITAMIN B-1) tablet 100 mg, 100 mg, Oral, Daily, Bel Boogie MD, 100 mg at 21 0843  •  traZODone (DESYREL) tablet 50 mg, 50 mg, Oral, Nightly PRN, Bel Boogie MD, 50 mg at 211    ASSESSMENT & PLAN:      Alcohol use disorder, severe, dependence (CMS/HCC)  - Continue Ativan detox  - Thiamine and folate       Diabetes mellitus (CMS/HCC)  - Metformin  - Tradjenta  - Sliding scale coverage       Hypertension  - Metoprolol       Seizures (CMS/HCC)  - Depakote and Keppra       Hyperlipidemia  - Lipitor       Anxiety  -  "Buspar    Special precautions: Special Precautions Level 4 (q30 min checks).    Behavioral Health Treatment Plan and Problem List: I have reviewed and approved the Behavioral Health Treatment Plan and Problem list.  The patient has had a chance to review and agrees with the treatment plan.     Clinician:  Bel Boogie MD  21  11:30 EST    Electronically signed by Bel Boogie MD at 21 1131     Bel Boogie MD at 21 1302          INPATIENT PSYCHIATRIC PROGRESS NOTE    Name:  Kt Hernandez  :  1965  MRN:  [id number]  Visit Number:  [id number]  Length of stay:  1    SUBJECTIVE  CC/Focus of Exam: Alcohol use    INTERVAL HISTORY:  The patient reports he is feeling some better.  He reports ongoing weakness and tremors.  Denies any auditory or visual hallucinations.  Depression rating 5/10  Anxiety rating 5/10  Sleep: Better  Withdrawal sx: Tremors, weakness  Cravin/10    Review of Systems   Respiratory: Negative.    Cardiovascular: Negative.    Gastrointestinal: Negative.    Neurological: Positive for tremors and weakness.   Psychiatric/Behavioral: Positive for dysphoric mood. The patient is nervous/anxious.        OBJECTIVE    Temp:  [97 °F (36.1 °C)-99.1 °F (37.3 °C)] 97.9 °F (36.6 °C)  Heart Rate:  [79-91] 84  Resp:  [18-20] 18  BP: (127-145)/(80-93) 128/80    MENTAL STATUS EXAM:  Appearance:Casually dressed, good hygeine.   Cooperation:Cooperative  Psychomotor: No psychomotor agitation/retardation, No EPS, No motor tics  Speech-normal rate, amount.  Mood \"better\"   Affect-congruent, appropriate, stable  Thought Content-goal directed, no delusional material present  Thought process-linear, organized.  Suicidality: No SI  Homicidality: No HI  Perception: No AH/VH  Insight-fair   Judgement-fair    Lab Results (last 24 hours)     Procedure Component Value Units Date/Time    POC Glucose Once [[id number]]  (Normal) Collected: 21 06    Specimen: Blood Updated: 21 " 0612     Glucose 120 mg/dL     POC Glucose Once [[id number]]  (Abnormal) Collected: 03/06/21 2146    Specimen: Blood Updated: 03/06/21 2152     Glucose 146 mg/dL              Imaging Results (Last 24 Hours)     ** No results found for the last 24 hours. **             ECG/EMG Results (most recent)     None           ALLERGIES: Patient has no known allergies.      Current Facility-Administered Medications:   •  acetaminophen (TYLENOL) tablet 650 mg, 650 mg, Oral, Q6H PRN, Bel Boogie MD, 650 mg at 03/07/21 0904  •  aluminum-magnesium hydroxide-simethicone (MAALOX MAX) 400-400-40 MG/5ML suspension 15 mL, 15 mL, Oral, Q6H PRN, Bel Boogie MD  •  atorvastatin (LIPITOR) tablet 20 mg, 20 mg, Oral, Daily, Bel Boogie MD, 20 mg at 03/07/21 0906  •  B-complex with vitamin C tablet 2 tablet, 2 tablet, Oral, Daily, Bel Boogie MD, 2 tablet at 03/07/21 0904  •  benzonatate (TESSALON) capsule 100 mg, 100 mg, Oral, TID PRN, Bel Boogie MD  •  benztropine (COGENTIN) tablet 2 mg, 2 mg, Oral, Once PRN **OR** benztropine (COGENTIN) injection 1 mg, 1 mg, Intramuscular, Once PRN, Bel Boogie MD  •  busPIRone (BUSPAR) tablet 5 mg, 5 mg, Oral, Daily, Bel Boogie MD, 5 mg at 03/07/21 0900  •  divalproex (DEPAKOTE ER) 24 hr tablet 500 mg, 500 mg, Oral, TID, Bel Boogie MD, 500 mg at 03/07/21 0900  •  famotidine (PEPCID) tablet 20 mg, 20 mg, Oral, BID PRN, Bel Boogie MD  •  hydrOXYzine (ATARAX) tablet 50 mg, 50 mg, Oral, Q6H PRN, Bel Boogie MD  •  ibuprofen (ADVIL,MOTRIN) tablet 400 mg, 400 mg, Oral, Q6H PRN, Bel Boogie MD  •  levETIRAcetam (KEPPRA) tablet 500 mg, 500 mg, Oral, Q12H, Bel Boogie MD, 500 mg at 03/07/21 0906  •  linagliptin (TRADJENTA) tablet 5 mg, 5 mg, Oral, Daily, Bel Boogie MD, 5 mg at 03/07/21 0906  •  loperamide (IMODIUM) capsule 2 mg, 2 mg, Oral, Q2H PRN, Bel Boogie MD  •  LORazepam (ATIVAN) tablet 2 mg, 2 mg, Oral, 3 times per day, 2 mg at 03/07/21 0901 **FOLLOWED BY**  [START ON 3/8/2021] LORazepam (ATIVAN) tablet 1.5 mg, 1.5 mg, Oral, 3 times per day **FOLLOWED BY** [START ON 3/9/2021] LORazepam (ATIVAN) tablet 1 mg, 1 mg, Oral, 3 times per day **FOLLOWED BY** [START ON 3/10/2021] LORazepam (ATIVAN) tablet 0.5 mg, 0.5 mg, Oral, 3 times per day, Bel Boogie MD  •  LORazepam (ATIVAN) tablet 2 mg, 2 mg, Oral, Q4H PRN **FOLLOWED BY** [START ON 3/8/2021] LORazepam (ATIVAN) tablet 1.5 mg, 1.5 mg, Oral, Q4H PRN **FOLLOWED BY** [START ON 3/9/2021] LORazepam (ATIVAN) tablet 1 mg, 1 mg, Oral, Q4H PRN **FOLLOWED BY** [START ON 3/10/2021] LORazepam (ATIVAN) tablet 0.5 mg, 0.5 mg, Oral, Q4H PRN, Bel Boogie MD  •  magnesium hydroxide (MILK OF MAGNESIA) suspension 2400 mg/10mL 10 mL, 10 mL, Oral, Daily PRN, Bel Boogie MD  •  metFORMIN (GLUCOPHAGE) tablet 1,000 mg, 1,000 mg, Oral, BID With Meals, Bel Boogie MD, 1,000 mg at 03/07/21 0905  •  metoprolol tartrate (LOPRESSOR) tablet 25 mg, 25 mg, Oral, Daily, Bel Boogie MD, 25 mg at 03/07/21 0900  •  multivitamin (THERAGRAN) tablet 1 tablet, 1 tablet, Oral, Daily, Bel Boogie MD, 1 tablet at 03/07/21 0906  •  nicotine (NICODERM CQ) 21 MG/24HR patch 1 patch, 1 patch, Transdermal, Q24H, Bel Boogie MD, 1 patch at 03/07/21 0915  •  ondansetron (ZOFRAN) tablet 4 mg, 4 mg, Oral, Q6H PRN, Bel Boogie MD  •  pantoprazole (PROTONIX) EC tablet 40 mg, 40 mg, Oral, Daily, Bel Boogie MD, 40 mg at 03/07/21 0900  •  sodium chloride nasal spray 2 spray, 2 spray, Each Nare, PRN, Bel Boogie MD  •  tamsulosin (FLOMAX) 24 hr capsule 0.4 mg, 0.4 mg, Oral, Daily, Bel Boogie MD, 0.4 mg at 03/07/21 0900  •  thiamine (VITAMIN B-1) tablet 100 mg, 100 mg, Oral, Daily, Bel Boogie MD, 100 mg at 03/07/21 0900  •  traZODone (DESYREL) tablet 50 mg, 50 mg, Oral, Nightly PRN, Bel Boogie MD, 50 mg at 03/06/21 8975    ASSESSMENT & PLAN:      Alcohol use disorder, severe, dependence (CMS/HCC)  - Continue Ativan detox  - Thiamine and  folate       Diabetes mellitus (CMS/HCC)  - Metformin  - Tradjenta  - Sliding scale coverage       Hypertension  - Metoprolol       Seizures (CMS/HCC)  - Depakote and Keppra       Hyperlipidemia  - Lipitor       Anxiety  - Buspar    Special precautions: Special Precautions Level 4 (q30 min checks).    Behavioral Health Treatment Plan and Problem List: I have reviewed and approved the Behavioral Health Treatment Plan and Problem list.  The patient has had a chance to review and agrees with the treatment plan.     Clinician:  Bel Boogie MD  03/07/21  13:02 EST    Electronically signed by Bel Boogie MD at 03/07/21 3746

## 2021-03-09 LAB
GLUCOSE BLDC GLUCOMTR-MCNC: 109 MG/DL (ref 70–130)
GLUCOSE BLDC GLUCOMTR-MCNC: 124 MG/DL (ref 70–130)

## 2021-03-09 PROCEDURE — 99232 SBSQ HOSP IP/OBS MODERATE 35: CPT | Performed by: PSYCHIATRY & NEUROLOGY

## 2021-03-09 PROCEDURE — 82962 GLUCOSE BLOOD TEST: CPT

## 2021-03-09 RX ADMIN — DIVALPROEX SODIUM 500 MG: 500 TABLET, FILM COATED, EXTENDED RELEASE ORAL at 09:32

## 2021-03-09 RX ADMIN — LORAZEPAM 1 MG: 1 TABLET ORAL at 14:45

## 2021-03-09 RX ADMIN — DIVALPROEX SODIUM 500 MG: 500 TABLET, FILM COATED, EXTENDED RELEASE ORAL at 16:14

## 2021-03-09 RX ADMIN — Medication 100 MG: at 09:33

## 2021-03-09 RX ADMIN — NICOTINE TRANSDERMAL SYSTEM 1 PATCH: 21 PATCH, EXTENDED RELEASE TRANSDERMAL at 09:34

## 2021-03-09 RX ADMIN — METOPROLOL TARTRATE 25 MG: 25 TABLET, FILM COATED ORAL at 09:32

## 2021-03-09 RX ADMIN — LORAZEPAM 1 MG: 1 TABLET ORAL at 07:56

## 2021-03-09 RX ADMIN — Medication 1 TABLET: at 09:32

## 2021-03-09 RX ADMIN — LINAGLIPTIN 5 MG: 5 TABLET, FILM COATED ORAL at 09:32

## 2021-03-09 RX ADMIN — TRAZODONE HYDROCHLORIDE 50 MG: 50 TABLET ORAL at 21:26

## 2021-03-09 RX ADMIN — LEVETIRACETAM 500 MG: 500 TABLET, FILM COATED ORAL at 21:26

## 2021-03-09 RX ADMIN — LEVETIRACETAM 500 MG: 500 TABLET, FILM COATED ORAL at 09:32

## 2021-03-09 RX ADMIN — Medication 2 TABLET: at 09:32

## 2021-03-09 RX ADMIN — DIVALPROEX SODIUM 500 MG: 500 TABLET, FILM COATED, EXTENDED RELEASE ORAL at 21:26

## 2021-03-09 RX ADMIN — METFORMIN HYDROCHLORIDE 1000 MG: 500 TABLET ORAL at 07:56

## 2021-03-09 RX ADMIN — TAMSULOSIN HYDROCHLORIDE 0.4 MG: 0.4 CAPSULE ORAL at 09:33

## 2021-03-09 RX ADMIN — ATORVASTATIN CALCIUM 20 MG: 20 TABLET, FILM COATED ORAL at 09:32

## 2021-03-09 RX ADMIN — LORAZEPAM 1 MG: 1 TABLET ORAL at 21:26

## 2021-03-09 RX ADMIN — METFORMIN HYDROCHLORIDE 1000 MG: 500 TABLET ORAL at 17:17

## 2021-03-09 RX ADMIN — PANTOPRAZOLE SODIUM 40 MG: 40 TABLET, DELAYED RELEASE ORAL at 09:33

## 2021-03-09 RX ADMIN — BUSPIRONE HYDROCHLORIDE 5 MG: 10 TABLET ORAL at 09:32

## 2021-03-09 NOTE — PLAN OF CARE
Goal Outcome Evaluation:  Plan of Care Reviewed With: patient  Progress: improving  Outcome Summary: pt still unsteady and tremulous requiring a sitter.

## 2021-03-09 NOTE — PROGRESS NOTES
Navigator is helping Primary Therapist with discharge planning for patient. Navigator completed the following referrals on this day:     [facility] - [phone]  -Sent 3/8  -Spoke with intake. They only offer CD IOP and sober living now, no residential programs. If patient is interested in sober living he will need to call the day before discharge to complete an assessment, the assessment is only good for 24 hours. 3/8
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
## 2021-03-09 NOTE — PROGRESS NOTES
"INPATIENT PSYCHIATRIC PROGRESS NOTE    Name:  Kt Hernandez  :  1965  MRN:  [id number]  Visit Number:  [id number]  Length of stay:  3    SUBJECTIVE  CC/Focus of Exam: Alcohol use    INTERVAL HISTORY:  The patient reports he is feeling some better and is regaining strength. Reports ongoing tremors and weakness.  Depression rating 5/10  Anxiety rating 5/10  Sleep: Better  Withdrawal sx: Tremors, weakness  Cravin/10    Review of Systems   Respiratory: Negative.    Cardiovascular: Negative.    Gastrointestinal: Negative.    Neurological: Positive for tremors and weakness.   Psychiatric/Behavioral: Positive for dysphoric mood. The patient is nervous/anxious.        OBJECTIVE    Temp:  [97.3 °F (36.3 °C)-99.2 °F (37.3 °C)] 97.3 °F (36.3 °C)  Heart Rate:  [] 92  Resp:  [16-18] 16  BP: (115-121)/(72-80) 121/72    MENTAL STATUS EXAM:  Appearance:Casually dressed, good hygeine.   Cooperation:Cooperative  Psychomotor: No psychomotor agitation/retardation, No EPS, No motor tics  Speech-normal rate, amount.  Mood \"better\"   Affect-congruent, appropriate, stable  Thought Content-goal directed, no delusional material present  Thought process-linear, organized.  Suicidality: No SI  Homicidality: No HI  Perception: No AH/VH  Insight-fair   Judgement-fair    Lab Results (last 24 hours)     Procedure Component Value Units Date/Time    POC Glucose Once [[id number]]  (Normal) Collected: 21    Specimen: Blood Updated: 21 06     Glucose 109 mg/dL              Imaging Results (Last 24 Hours)     ** No results found for the last 24 hours. **             ECG/EMG Results (most recent)     None           ALLERGIES: Patient has no known allergies.      Current Facility-Administered Medications:   •  acetaminophen (TYLENOL) tablet 650 mg, 650 mg, Oral, Q6H PRN, Bel Boogie MD, 650 mg at 21 0904  •  aluminum-magnesium hydroxide-simethicone (MAALOX MAX) 400-400-40 MG/5ML suspension 15 mL, 15 " mL, Oral, Q6H PRN, Bel Boogie MD  •  atorvastatin (LIPITOR) tablet 20 mg, 20 mg, Oral, Daily, Bel Boogie MD, 20 mg at 21  •  B-complex with vitamin C tablet 2 tablet, 2 tablet, Oral, Daily, Bel Boogie MD, 2 tablet at 21  •  benzonatate (TESSALON) capsule 100 mg, 100 mg, Oral, TID PRN, Bel Boogie MD  •  benztropine (COGENTIN) tablet 2 mg, 2 mg, Oral, Once PRN **OR** benztropine (COGENTIN) injection 1 mg, 1 mg, Intramuscular, Once PRN, Bel Boogie MD  •  busPIRone (BUSPAR) tablet 5 mg, 5 mg, Oral, Daily, Bel Boogie MD, 5 mg at 21  •  divalproex (DEPAKOTE ER) 24 hr tablet 500 mg, 500 mg, Oral, TID, Bel Boogie MD, 500 mg at 21  •  famotidine (PEPCID) tablet 20 mg, 20 mg, Oral, BID PRN, Bel Boogie MD  •  hydrOXYzine (ATARAX) tablet 50 mg, 50 mg, Oral, Q6H PRN, Bel Boogie MD  •  ibuprofen (ADVIL,MOTRIN) tablet 400 mg, 400 mg, Oral, Q6H PRN, Bel Boogie MD  •  levETIRAcetam (KEPPRA) tablet 500 mg, 500 mg, Oral, Q12H, Bel Boogie MD, 500 mg at 21  •  linagliptin (TRADJENTA) tablet 5 mg, 5 mg, Oral, Daily, Bel Boogie MD, 5 mg at 21  •  loperamide (IMODIUM) capsule 2 mg, 2 mg, Oral, Q2H PRN, Bel Boogie MD  •  [COMPLETED] LORazepam (ATIVAN) tablet 2 mg, 2 mg, Oral, 3 times per day, 2 mg at 21 **FOLLOWED BY** [COMPLETED] LORazepam (ATIVAN) tablet 1.5 mg, 1.5 mg, Oral, 3 times per day, 1.5 mg at 21 2141 **FOLLOWED BY** LORazepam (ATIVAN) tablet 1 mg, 1 mg, Oral, 3 times per day, 1 mg at 21 0756 **FOLLOWED BY** [START ON 3/10/2021] LORazepam (ATIVAN) tablet 0.5 mg, 0.5 mg, Oral, 3 times per day, Bel Boogie MD  •  [] LORazepam (ATIVAN) tablet 2 mg, 2 mg, Oral, Q4H PRN **FOLLOWED BY** [] LORazepam (ATIVAN) tablet 1.5 mg, 1.5 mg, Oral, Q4H PRN **FOLLOWED BY** LORazepam (ATIVAN) tablet 1 mg, 1 mg, Oral, Q4H PRN **FOLLOWED BY** [START ON 3/10/2021] LORazepam (ATIVAN) tablet 0.5 mg,  0.5 mg, Oral, Q4H PRN, Bel Boogie MD  •  magnesium hydroxide (MILK OF MAGNESIA) suspension 2400 mg/10mL 10 mL, 10 mL, Oral, Daily PRN, Bel Boogie MD  •  metFORMIN (GLUCOPHAGE) tablet 1,000 mg, 1,000 mg, Oral, BID With Meals, Bel Boogie MD, 1,000 mg at 03/09/21 0756  •  metoprolol tartrate (LOPRESSOR) tablet 25 mg, 25 mg, Oral, Daily, Bel Boogie MD, 25 mg at 03/09/21 0932  •  multivitamin (THERAGRAN) tablet 1 tablet, 1 tablet, Oral, Daily, Bel Boogie MD, 1 tablet at 03/09/21 0932  •  nicotine (NICODERM CQ) 21 MG/24HR patch 1 patch, 1 patch, Transdermal, Q24H, Bel Boogie MD, 1 patch at 03/09/21 0934  •  ondansetron (ZOFRAN) tablet 4 mg, 4 mg, Oral, Q6H PRN, Bel Boogie MD  •  pantoprazole (PROTONIX) EC tablet 40 mg, 40 mg, Oral, Daily, Bel Boogie MD, 40 mg at 03/09/21 0933  •  sodium chloride nasal spray 2 spray, 2 spray, Each Nare, PRN, Bel Boogie MD  •  tamsulosin (FLOMAX) 24 hr capsule 0.4 mg, 0.4 mg, Oral, Daily, Bel Boogie MD, 0.4 mg at 03/09/21 0933  •  thiamine (VITAMIN B-1) tablet 100 mg, 100 mg, Oral, Daily, Bel Boogie MD, 100 mg at 03/09/21 0933  •  traZODone (DESYREL) tablet 50 mg, 50 mg, Oral, Nightly PRN, Bel Boogie MD, 50 mg at 03/08/21 2148    ASSESSMENT & PLAN:      Alcohol use disorder, severe, dependence (CMS/HCC)  - Continue Ativan detox  - Thiamine and folate       Diabetes mellitus (CMS/HCC)  - Metformin  - Tradjenta  - Sliding scale coverage       Hypertension  - Metoprolol       Seizures (CMS/HCC)  - Depakote and Keppra       Hyperlipidemia  - Lipitor       Anxiety  - Buspar    Special precautions: Special Precautions Level 4 (q30 min checks).    Behavioral Health Treatment Plan and Problem List: I have reviewed and approved the Behavioral Health Treatment Plan and Problem list.  The patient has had a chance to review and agrees with the treatment plan.     Clinician:  Bel Boogie MD  03/09/21  13:48 EST

## 2021-03-09 NOTE — PLAN OF CARE
Goal Outcome Evaluation:  Plan of Care Reviewed With: patient  Progress: improving  Outcome Summary: Pt continues to have sitter for safety . Pt reports he is feeling better tonight. Rates craving amn 8 and tremors noted. Reports he is looking to go to [facility] for further treatment

## 2021-03-10 LAB
GLUCOSE BLDC GLUCOMTR-MCNC: 103 MG/DL (ref 70–130)
GLUCOSE BLDC GLUCOMTR-MCNC: 92 MG/DL (ref 70–130)

## 2021-03-10 PROCEDURE — 82962 GLUCOSE BLOOD TEST: CPT

## 2021-03-10 PROCEDURE — 99232 SBSQ HOSP IP/OBS MODERATE 35: CPT | Performed by: PSYCHIATRY & NEUROLOGY

## 2021-03-10 RX ADMIN — METOPROLOL TARTRATE 25 MG: 25 TABLET, FILM COATED ORAL at 08:24

## 2021-03-10 RX ADMIN — BUSPIRONE HYDROCHLORIDE 5 MG: 10 TABLET ORAL at 08:24

## 2021-03-10 RX ADMIN — PANTOPRAZOLE SODIUM 40 MG: 40 TABLET, DELAYED RELEASE ORAL at 08:24

## 2021-03-10 RX ADMIN — METFORMIN HYDROCHLORIDE 1000 MG: 500 TABLET ORAL at 16:41

## 2021-03-10 RX ADMIN — LINAGLIPTIN 5 MG: 5 TABLET, FILM COATED ORAL at 08:22

## 2021-03-10 RX ADMIN — LEVETIRACETAM 500 MG: 500 TABLET, FILM COATED ORAL at 21:11

## 2021-03-10 RX ADMIN — LORAZEPAM 0.5 MG: 0.5 TABLET ORAL at 08:27

## 2021-03-10 RX ADMIN — Medication 100 MG: at 08:24

## 2021-03-10 RX ADMIN — TAMSULOSIN HYDROCHLORIDE 0.4 MG: 0.4 CAPSULE ORAL at 08:24

## 2021-03-10 RX ADMIN — DIVALPROEX SODIUM 500 MG: 500 TABLET, FILM COATED, EXTENDED RELEASE ORAL at 21:11

## 2021-03-10 RX ADMIN — Medication 2 TABLET: at 08:24

## 2021-03-10 RX ADMIN — ATORVASTATIN CALCIUM 20 MG: 20 TABLET, FILM COATED ORAL at 08:22

## 2021-03-10 RX ADMIN — TRAZODONE HYDROCHLORIDE 50 MG: 50 TABLET ORAL at 21:11

## 2021-03-10 RX ADMIN — Medication 1 TABLET: at 08:22

## 2021-03-10 RX ADMIN — DIVALPROEX SODIUM 500 MG: 500 TABLET, FILM COATED, EXTENDED RELEASE ORAL at 08:24

## 2021-03-10 RX ADMIN — NICOTINE TRANSDERMAL SYSTEM 1 PATCH: 21 PATCH, EXTENDED RELEASE TRANSDERMAL at 08:26

## 2021-03-10 RX ADMIN — LORAZEPAM 0.5 MG: 0.5 TABLET ORAL at 21:11

## 2021-03-10 RX ADMIN — METFORMIN HYDROCHLORIDE 1000 MG: 500 TABLET ORAL at 08:22

## 2021-03-10 RX ADMIN — LEVETIRACETAM 500 MG: 500 TABLET, FILM COATED ORAL at 08:22

## 2021-03-10 RX ADMIN — DIVALPROEX SODIUM 500 MG: 500 TABLET, FILM COATED, EXTENDED RELEASE ORAL at 15:09

## 2021-03-10 RX ADMIN — LORAZEPAM 0.5 MG: 0.5 TABLET ORAL at 14:15

## 2021-03-10 NOTE — PLAN OF CARE
Goal Outcome Evaluation:  Plan of Care Reviewed With: patient  Progress: improving  Outcome Summary: Patient attended group in dayroom with assist of sitter.  Pt's gait remains unsteady requiring assist from bed to wheelchair.  Pt rates anxiety at 8 and depression at 5. Pt plans to go to [facility].

## 2021-03-10 NOTE — PROGRESS NOTES
"INPATIENT PSYCHIATRIC PROGRESS NOTE    Name:  Kt Hernandez  :  1965  MRN:  [id number]  Visit Number:  [id number]  Length of stay:  4    SUBJECTIVE  CC/Focus of Exam: Alcohol use    INTERVAL HISTORY:  The patient reports he is feeling weak and tremulous and feels he is still experiencing some withdrawals.   Depression rating 5/10  Anxiety rating 5/10  Sleep: Better  Withdrawal sx: Tremors, weakness  Cravin/10    Review of Systems   Respiratory: Negative.    Cardiovascular: Negative.    Gastrointestinal: Negative.    Neurological: Positive for tremors and weakness.   Psychiatric/Behavioral: Positive for dysphoric mood. The patient is nervous/anxious.        OBJECTIVE    Temp:  [97 °F (36.1 °C)-98.6 °F (37 °C)] 97.4 °F (36.3 °C)  Heart Rate:  [62-91] 90  Resp:  [16-18] 18  BP: (123-133)/(76-91) 123/80    MENTAL STATUS EXAM:  Appearance:Casually dressed, good hygeine.   Cooperation:Cooperative  Psychomotor: No psychomotor agitation/retardation, No EPS, No motor tics  Speech-normal rate, amount.  Mood \"better\"   Affect-congruent, appropriate, stable  Thought Content-goal directed, no delusional material present  Thought process-linear, organized.  Suicidality: No SI  Homicidality: No HI  Perception: No AH/VH  Insight-fair   Judgement-fair    Lab Results (last 24 hours)     Procedure Component Value Units Date/Time    POC Glucose Once [[id number]]  (Normal) Collected: 03/10/21 0641    Specimen: Blood Updated: 03/10/21 0648     Glucose 92 mg/dL              Imaging Results (Last 24 Hours)     ** No results found for the last 24 hours. **             ECG/EMG Results (most recent)     None           ALLERGIES: Patient has no known allergies.      Current Facility-Administered Medications:   •  acetaminophen (TYLENOL) tablet 650 mg, 650 mg, Oral, Q6H PRN, Bel Boogie MD, 650 mg at 21 0904  •  aluminum-magnesium hydroxide-simethicone (MAALOX MAX) 400-400-40 MG/5ML suspension 15 mL, 15 mL, Oral, " Q6H PRN, Bel Boogie MD  •  atorvastatin (LIPITOR) tablet 20 mg, 20 mg, Oral, Daily, Ble Boogie MD, 20 mg at 03/10/21 0822  •  B-complex with vitamin C tablet 2 tablet, 2 tablet, Oral, Daily, Bel Boogie MD, 2 tablet at 03/10/21 0824  •  benzonatate (TESSALON) capsule 100 mg, 100 mg, Oral, TID PRN, Bel Boogie MD  •  benztropine (COGENTIN) tablet 2 mg, 2 mg, Oral, Once PRN **OR** benztropine (COGENTIN) injection 1 mg, 1 mg, Intramuscular, Once PRN, Bel Boogie MD  •  busPIRone (BUSPAR) tablet 5 mg, 5 mg, Oral, Daily, Bel Boogie MD, 5 mg at 03/10/21 0824  •  divalproex (DEPAKOTE ER) 24 hr tablet 500 mg, 500 mg, Oral, TID, Bel Boogie MD, 500 mg at 03/10/21 0824  •  famotidine (PEPCID) tablet 20 mg, 20 mg, Oral, BID PRN, Bel Boogie MD  •  hydrOXYzine (ATARAX) tablet 50 mg, 50 mg, Oral, Q6H PRN, Bel Boogie MD  •  ibuprofen (ADVIL,MOTRIN) tablet 400 mg, 400 mg, Oral, Q6H PRN, Bel Boogie MD  •  levETIRAcetam (KEPPRA) tablet 500 mg, 500 mg, Oral, Q12H, Bel Boogie MD, 500 mg at 03/10/21 0822  •  linagliptin (TRADJENTA) tablet 5 mg, 5 mg, Oral, Daily, Bel Boogie MD, 5 mg at 03/10/21 0822  •  loperamide (IMODIUM) capsule 2 mg, 2 mg, Oral, Q2H PRN, Bel Boogie MD  •  [COMPLETED] LORazepam (ATIVAN) tablet 2 mg, 2 mg, Oral, 3 times per day, 2 mg at 21 **FOLLOWED BY** [COMPLETED] LORazepam (ATIVAN) tablet 1.5 mg, 1.5 mg, Oral, 3 times per day, 1.5 mg at 21 **FOLLOWED BY** [COMPLETED] LORazepam (ATIVAN) tablet 1 mg, 1 mg, Oral, 3 times per day, 1 mg at 21 **FOLLOWED BY** LORazepam (ATIVAN) tablet 0.5 mg, 0.5 mg, Oral, 3 times per day, Bel Boogie MD, 0.5 mg at 03/10/21 0827  •  [] LORazepam (ATIVAN) tablet 2 mg, 2 mg, Oral, Q4H PRN **FOLLOWED BY** [] LORazepam (ATIVAN) tablet 1.5 mg, 1.5 mg, Oral, Q4H PRN **FOLLOWED BY** [] LORazepam (ATIVAN) tablet 1 mg, 1 mg, Oral, Q4H PRN **FOLLOWED BY** LORazepam (ATIVAN) tablet 0.5 mg, 0.5  mg, Oral, Q4H PRN, Bel Boogie MD  •  magnesium hydroxide (MILK OF MAGNESIA) suspension 2400 mg/10mL 10 mL, 10 mL, Oral, Daily PRN, Bel Boogie MD  •  metFORMIN (GLUCOPHAGE) tablet 1,000 mg, 1,000 mg, Oral, BID With Meals, Bel Boogie MD, 1,000 mg at 03/10/21 0822  •  metoprolol tartrate (LOPRESSOR) tablet 25 mg, 25 mg, Oral, Daily, Bel Boogie MD, 25 mg at 03/10/21 0824  •  multivitamin (THERAGRAN) tablet 1 tablet, 1 tablet, Oral, Daily, Bel Boogie MD, 1 tablet at 03/10/21 0822  •  nicotine (NICODERM CQ) 21 MG/24HR patch 1 patch, 1 patch, Transdermal, Q24H, Bel Boogie MD, 1 patch at 03/10/21 0826  •  ondansetron (ZOFRAN) tablet 4 mg, 4 mg, Oral, Q6H PRN, Bel Boogie MD  •  pantoprazole (PROTONIX) EC tablet 40 mg, 40 mg, Oral, Daily, Bel Boogie MD, 40 mg at 03/10/21 0824  •  sodium chloride nasal spray 2 spray, 2 spray, Each Nare, PRN, Bel Boogie MD  •  tamsulosin (FLOMAX) 24 hr capsule 0.4 mg, 0.4 mg, Oral, Daily, Bel Boogie MD, 0.4 mg at 03/10/21 0824  •  thiamine (VITAMIN B-1) tablet 100 mg, 100 mg, Oral, Daily, Bel Boogie MD, 100 mg at 03/10/21 0824  •  traZODone (DESYREL) tablet 50 mg, 50 mg, Oral, Nightly PRN, Bel Boogie MD, 50 mg at 03/09/21 2126    ASSESSMENT & PLAN:      Alcohol use disorder, severe, dependence (CMS/HCC)  - Continue Ativan detox  - Thiamine and folate       Diabetes mellitus (CMS/HCC)  - Metformin  - Tradjenta  - Sliding scale coverage       Hypertension  - Metoprolol       Seizures (CMS/HCC)  - Depakote and Keppra       Hyperlipidemia  - Lipitor       Anxiety  - Buspar    Plan discharge tomorrow.    Special precautions: Special Precautions Level 4 (q30 min checks).    Behavioral Health Treatment Plan and Problem List: I have reviewed and approved the Behavioral Health Treatment Plan and Problem list.  The patient has had a chance to review and agrees with the treatment plan.     Clinician:  Bel Boogie MD  03/10/21  13:20 EST
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
## 2021-03-10 NOTE — PLAN OF CARE
Goal Outcome Evaluation:  Plan of Care Reviewed With: patient     Outcome Summary: patient cooperative, continues to require sitter due to unsteady gait.

## 2021-03-11 VITALS
SYSTOLIC BLOOD PRESSURE: 115 MMHG | WEIGHT: 170 LBS | RESPIRATION RATE: 20 BRPM | HEIGHT: 71 IN | OXYGEN SATURATION: 96 % | TEMPERATURE: 98.7 F | BODY MASS INDEX: 23.8 KG/M2 | DIASTOLIC BLOOD PRESSURE: 76 MMHG | HEART RATE: 108 BPM

## 2021-03-11 LAB — GLUCOSE BLDC GLUCOMTR-MCNC: 112 MG/DL (ref 70–130)

## 2021-03-11 PROCEDURE — 99238 HOSP IP/OBS DSCHRG MGMT 30/<: CPT | Performed by: PSYCHIATRY & NEUROLOGY

## 2021-03-11 PROCEDURE — 82962 GLUCOSE BLOOD TEST: CPT

## 2021-03-11 RX ORDER — ATORVASTATIN CALCIUM 20 MG/1
20 TABLET, FILM COATED ORAL DAILY
Qty: 30 TABLET | Refills: 0 | Status: SHIPPED | OUTPATIENT
Start: 2021-03-11

## 2021-03-11 RX ORDER — DIVALPROEX SODIUM 500 MG/1
500 TABLET, EXTENDED RELEASE ORAL 3 TIMES DAILY
Qty: 90 TABLET | Refills: 0 | Status: SHIPPED | OUTPATIENT
Start: 2021-03-11

## 2021-03-11 RX ORDER — TAMSULOSIN HYDROCHLORIDE 0.4 MG/1
1 CAPSULE ORAL DAILY
Qty: 30 CAPSULE | Refills: 0 | Status: SHIPPED | OUTPATIENT
Start: 2021-03-11

## 2021-03-11 RX ORDER — BUSPIRONE HYDROCHLORIDE 5 MG/1
5 TABLET ORAL DAILY
Qty: 30 TABLET | Refills: 0 | Status: SHIPPED | OUTPATIENT
Start: 2021-03-11

## 2021-03-11 RX ORDER — PANTOPRAZOLE SODIUM 40 MG/1
40 TABLET, DELAYED RELEASE ORAL DAILY
Qty: 30 TABLET | Refills: 0 | Status: SHIPPED | OUTPATIENT
Start: 2021-03-11

## 2021-03-11 RX ORDER — LEVETIRACETAM 500 MG/1
500 TABLET ORAL 2 TIMES DAILY
Qty: 60 TABLET | Refills: 0 | Status: SHIPPED | OUTPATIENT
Start: 2021-03-11

## 2021-03-11 RX ADMIN — Medication 2 TABLET: at 08:11

## 2021-03-11 RX ADMIN — METOPROLOL TARTRATE 25 MG: 25 TABLET, FILM COATED ORAL at 08:12

## 2021-03-11 RX ADMIN — DIVALPROEX SODIUM 500 MG: 500 TABLET, FILM COATED, EXTENDED RELEASE ORAL at 08:12

## 2021-03-11 RX ADMIN — PANTOPRAZOLE SODIUM 40 MG: 40 TABLET, DELAYED RELEASE ORAL at 08:12

## 2021-03-11 RX ADMIN — Medication 100 MG: at 08:11

## 2021-03-11 RX ADMIN — TAMSULOSIN HYDROCHLORIDE 0.4 MG: 0.4 CAPSULE ORAL at 08:12

## 2021-03-11 RX ADMIN — LINAGLIPTIN 5 MG: 5 TABLET, FILM COATED ORAL at 08:10

## 2021-03-11 RX ADMIN — ATORVASTATIN CALCIUM 20 MG: 20 TABLET, FILM COATED ORAL at 08:10

## 2021-03-11 RX ADMIN — METFORMIN HYDROCHLORIDE 1000 MG: 500 TABLET ORAL at 08:10

## 2021-03-11 RX ADMIN — NICOTINE TRANSDERMAL SYSTEM 1 PATCH: 21 PATCH, EXTENDED RELEASE TRANSDERMAL at 08:12

## 2021-03-11 RX ADMIN — BUSPIRONE HYDROCHLORIDE 5 MG: 10 TABLET ORAL at 08:11

## 2021-03-11 RX ADMIN — LEVETIRACETAM 500 MG: 500 TABLET, FILM COATED ORAL at 08:10

## 2021-03-11 RX ADMIN — Medication 1 TABLET: at 08:10

## 2021-03-11 NOTE — PLAN OF CARE
Problem: Adult Behavioral Health Plan of Care  Goal: Develops/Participates in Therapeutic [place] to Support Successful Transition  Intervention: Mutually Develop Transition Plan  Recent Flowsheet Documentation  Taken 3/11/2021 1157 by Cristal Rascon LCSW  Discharge Coordination/Progress: Patient has Medicare A&B/[place] Medicaid secondary.  Patient plans to attend [facility] and will stay with his sister until his admission.  Transportation organized today to his sisters home.  Transportation Anticipated: health plan transportation  Transportation Concerns: car, none  Current Discharge Risk: substance use/abuse  Concerns to be Addressed: substance/tobacco abuse/use  Readmission Within the Last 30 Days: no previous admission in last 30 days  Patient/Family Anticipated Services at Transition: rehabilitation services  Patient's Choice of Community Agency(s): [facility]  Patient/Family Anticipates Transition to: inpatient rehabilitation facility  Offered/Gave Vendor List: no    Therapist met with patient as he is being discharged today.  Assisted him in contacting his sister Shanell who is agreeable for patient to stay at her home until he goes to [facility].  Her address is [address] [place] [address].  Patient provided his cell number [phone] in order for staff at [facility] to contact him in the morning to discuss admission.  Patients sister currently does not have transportation.  This therapist organized [facility] for patient to go to his sisters home today after 3 p.m.

## 2021-03-11 NOTE — PLAN OF CARE
Problem: Adult Behavioral Health Plan of Care  Goal: Plan of Care Review  Outcome: Adequate for Care Transition  Flowsheets  Taken 3/11/2021 0944  Progress: improving  Plan of Care Reviewed With: patient  Patient Agreement with Plan of Care: agrees  Taken 3/11/2021 0997  Plan of Care Reviewed With: patient  Patient Agreement with Plan of Care: agrees   Goal Outcome Evaluation:  Plan of Care Reviewed With: patient  Progress: improving

## 2021-03-11 NOTE — PLAN OF CARE
Goal Outcome Evaluation:  Plan of Care Reviewed With: patient  Progress: improving   Pt states that he slept well all night and ate well. Pt has sleep well. Pt had sitter in place all night. Safety rounds complete. Pt rates Anx:5 and Dep:3. Denies SI/HI/AVH. When asked about his mood he states that he is thankful that he is getting help. He says he is having some cravings to cigarettes but he has the patch on and that he needs to quick because of his health history.

## 2021-03-11 NOTE — DISCHARGE INSTR - APPOINTMENTS
[facility]  [address] [place] [address] [phone]    You will receive a call tomorrow March 12th at 11:00 am

## 2021-03-11 NOTE — DISCHARGE SUMMARY
":  1965  MRN:  [id number]  Visit Number:  [id number]      Date of Admission:3/6/2021   Date of Discharge:  3/11/2021    Discharge Diagnosis:  Principal Problem:    Alcohol use disorder, severe, dependence (CMS/HCC)  Active Problems:    Diabetes mellitus (CMS/HCC)    Hypertension    Seizures (CMS/[facility])    Hyperlipidemia    Anxiety        Admission Diagnosis:  Alcohol abuse [F10.10]     HPI  Kt Hernandez is a 55 y.o. male who was admitted on 3/6/2021 with complaints of alcohol use and withdrawals. For details please see H&P dated 3/7/21.       Hospital Course  Patient is a 55 y.o. male presented with alcohol use and withdrawals.. The patient was admitted to the [facility] detox recovery unit for safety, further evaluation and treatment.  The patient was started on Ativan detox and was able to complete it without any complications.  He was continued on his home medications.  The patient was also able to take part in individual and group counseling sessions and work on appropriate coping skills.  The patient made steady improvement in his mood and expressed feeling more positive and hopeful about future. Sleep and appetite were improved.  The day of discharge the patient was calm, cooperative and pleasant. Mood was reported to be good, and denied SI/HI/AVH. Also reported no medication side effects.  .      Mental Status Exam upon discharge:   Mood \"good\"   Affect-congruent, appropriate, stable  Thought Content-goal directed, no delusional material present  Thought process-linear, organized.  Suicidality: No SI  Homicidality: No HI  Perception: No AH/VH    Procedures Performed         Consults:   Consults     No orders found from 2021 to 3/7/2021.          Pertinent Test Results: Glucose 175, Blood alcohol level 312 mg/dL, UDS negative,     Condition on Discharge:  improved    Vital Signs  Temp:  [97.8 °F (36.6 °C)-98.1 °F (36.7 °C)] 98.1 °F (36.7 °C)  Heart Rate:  [] 99  Resp:  [16-18] " 16  BP: (121-127)/(78-87) 121/78      Discharge Disposition:  Home or Self Care    Discharge Medications:     Discharge Medications      Changes to Medications      Instructions Start Date   levETIRAcetam 500 MG tablet  Commonly known as: KEPPRA  What changed: when to take this   500 mg, Oral, 2 Times Daily         Continue These Medications      Instructions Start Date   atorvastatin 20 MG tablet  Commonly known as: LIPITOR   20 mg, Oral, Daily      busPIRone 5 MG tablet  Commonly known as: BUSPAR   5 mg, Oral, Daily      divalproex 500 MG 24 hr tablet  Commonly known as: DEPAKOTE ER   500 mg, Oral, 3 Times Daily      metFORMIN 1000 MG tablet  Commonly known as: GLUCOPHAGE   1,000 mg, Oral, 2 Times Daily With Meals      metoprolol tartrate 25 MG tablet  Commonly known as: LOPRESSOR   25 mg, Oral, Daily      pantoprazole 40 MG EC tablet  Commonly known as: PROTONIX   40 mg, Oral, Daily      SITagliptin 100 MG tablet  Commonly known as: JANUVIA   100 mg, Oral, Daily      tamsulosin 0.4 MG capsule 24 hr capsule  Commonly known as: FLOMAX   0.4 mg, Oral, Daily             Discharge Diet: Consistent carbohydrate     Activity at Discharge: As tolerated     Follow-up Appointments      [facility]  [address] [place] [address] [phone]         Test Results Pending at Discharge      Time spent in discharge: < 30 min    Clinician:   Bel Boogie MD  03/11/21  09:02 EST

## 2021-03-11 NOTE — PLAN OF CARE
Problem: Adult Behavioral Health Plan of Care  Goal: Plan of Care Review  Outcome: Adequate for Care Transition  Flowsheets  Taken 3/11/2021 0944  Progress: improving  Plan of Care Reviewed With: patient  Patient Agreement with Plan of Care: agrees  Taken 3/11/2021 0927  Plan of Care Reviewed With: patient  Patient Agreement with Plan of Care: agrees   Goal Outcome Evaluation:  Plan of Care Reviewed With: patient  Progress: improving

## 2021-03-23 ENCOUNTER — BULK ORDERING (OUTPATIENT)
Dept: CASE MANAGEMENT | Facility: OTHER | Age: 56
End: 2021-03-23

## 2021-03-23 DIAGNOSIS — Z23 IMMUNIZATION DUE: ICD-10-CM
